# Patient Record
Sex: MALE | Race: WHITE | Employment: FULL TIME | URBAN - METROPOLITAN AREA
[De-identification: names, ages, dates, MRNs, and addresses within clinical notes are randomized per-mention and may not be internally consistent; named-entity substitution may affect disease eponyms.]

---

## 2019-03-30 ENCOUNTER — HOSPITAL ENCOUNTER (EMERGENCY)
Age: 59
Discharge: HOME | End: 2019-03-30
Attending: EMERGENCY MEDICINE
Payer: COMMERCIAL

## 2019-03-30 ENCOUNTER — APPOINTMENT (OUTPATIENT)
Dept: GENERAL RADIOLOGY | Age: 59
End: 2019-03-30
Payer: COMMERCIAL

## 2019-03-30 ENCOUNTER — APPOINTMENT (OUTPATIENT)
Dept: CT IMAGING | Age: 59
End: 2019-03-30
Payer: COMMERCIAL

## 2019-03-30 VITALS
HEART RATE: 69 BPM | OXYGEN SATURATION: 98 % | DIASTOLIC BLOOD PRESSURE: 73 MMHG | SYSTOLIC BLOOD PRESSURE: 121 MMHG | RESPIRATION RATE: 18 BRPM | TEMPERATURE: 97.8 F
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
## 2019-03-30 LAB
ANION GAP SERPL CALCULATED.3IONS-SCNC: 13 MMOL/L (ref 3–16)
BUN BLDV-MCNC: 22 MG/DL (ref 7–20)
CALCIUM SERPL-MCNC: 9.2 MG/DL (ref 8.3–10.6)
CHLORIDE BLD-SCNC: 104 MMOL/L (ref 99–110)
CO2: 24 MMOL/L (ref 21–32)
CREAT SERPL-MCNC: 0.8 MG/DL (ref 0.9–1.3)
GFR AFRICAN AMERICAN: >60
GFR NON-AFRICAN AMERICAN: >60
GLUCOSE BLD-MCNC: 126 MG/DL (ref 70–99)
HCT VFR BLD CALC: 44.6 % (ref 40.5–52.5)
HEMOGLOBIN: 15 G/DL (ref 13.5–17.5)
MCH RBC QN AUTO: 32.2 PG (ref 26–34)
MCHC RBC AUTO-ENTMCNC: 33.7 G/DL (ref 31–36)
MCV RBC AUTO: 95.5 FL (ref 80–100)
PDW BLD-RTO: 12.2 % (ref 12.4–15.4)
PLATELET # BLD: 264 K/UL (ref 135–450)
PMV BLD AUTO: 7.5 FL (ref 5–10.5)
POTASSIUM SERPL-SCNC: 3.6 MMOL/L (ref 3.5–5.1)
RBC # BLD: 4.67 M/UL (ref 4.2–5.9)
SODIUM BLD-SCNC: 141 MMOL/L (ref 136–145)
WBC # BLD: 7.2 K/UL (ref 4–11)

## 2019-03-30 PROCEDURE — 36415 COLL VENOUS BLD VENIPUNCTURE: CPT

## 2019-03-30 PROCEDURE — 73502 X-RAY EXAM HIP UNI 2-3 VIEWS: CPT

## 2019-03-30 PROCEDURE — 73130 X-RAY EXAM OF HAND: CPT

## 2019-03-30 PROCEDURE — 70450 CT HEAD/BRAIN W/O DYE: CPT

## 2019-03-30 PROCEDURE — 99284 EMERGENCY DEPT VISIT MOD MDM: CPT

## 2019-03-30 PROCEDURE — 73090 X-RAY EXAM OF FOREARM: CPT

## 2019-03-30 PROCEDURE — 80048 BASIC METABOLIC PNL TOTAL CA: CPT

## 2019-03-30 PROCEDURE — 85027 COMPLETE CBC AUTOMATED: CPT

## 2019-03-30 PROCEDURE — 73080 X-RAY EXAM OF ELBOW: CPT

## 2019-03-30 PROCEDURE — 6370000000 HC RX 637 (ALT 250 FOR IP): Performed by: EMERGENCY MEDICINE

## 2019-03-30 RX ORDER — MECLIZINE HYDROCHLORIDE 25 MG/1
25 TABLET ORAL 3 TIMES DAILY PRN
Qty: 21 TABLET | Refills: 0 | Status: SHIPPED | OUTPATIENT
Start: 2019-03-30 | End: 2019-04-06

## 2019-03-30 RX ORDER — MECLIZINE HCL 12.5 MG/1
12.5 TABLET ORAL ONCE
Status: COMPLETED | OUTPATIENT
Start: 2019-03-30 | End: 2019-03-30

## 2019-03-30 RX ADMIN — MECLIZINE 12.5 MG: 12.5 TABLET ORAL at 03:34

## 2019-03-30 ASSESSMENT — PAIN SCALES - GENERAL
PAINLEVEL_OUTOF10: 2

## 2019-03-30 ASSESSMENT — PAIN - FUNCTIONAL ASSESSMENT: PAIN_FUNCTIONAL_ASSESSMENT: 0-10

## 2019-03-30 ASSESSMENT — PAIN DESCRIPTION - LOCATION: LOCATION: HIP

## 2019-03-30 ASSESSMENT — PAIN DESCRIPTION - PAIN TYPE: TYPE: ACUTE PAIN

## 2019-03-30 ASSESSMENT — PAIN DESCRIPTION - ORIENTATION: ORIENTATION: RIGHT

## 2019-03-30 NOTE — ED PROVIDER NOTES
Triage Chief Complaint:   Fall (slipped on wet floor at work)    [employer]:  Chelsi Potts is a 62 y.o. male with past medical history of vertigo presents for evaluation of multiple complaints after mechanical trip and fall at work. The patient was at work and fell from standing position when he slipped on wet floor. He states he is complaining of right hip pain, left upper extremity pain from the elbow distal and also states he feels dizzy after the fall. No nausea no vomiting no fever no chills no numbness no tingling    ROS:  At least 9 systems reviewed and otherwise acutely negative except as in the [address] Past Medical History:   Diagnosis Date    Hypertension      No past surgical history on file.   Family History   Problem Relation Age of Onset    Diabetes Father     Heart Disease Father 61        MI    Diabetes Brother     Diabetes Maternal Grandfather     Diabetes Paternal Grandfather      Social History     Socioeconomic History    Marital status:      Spouse name: Not on file    Number of children: Not on file    Years of education: Not on file    Highest education level: Not on file   Occupational History    Not on file   Social Needs    Financial resource strain: Not on file    Food insecurity:     Worry: Not on file     Inability: Not on file    Transportation needs:     Medical: Not on file     Non-medical: Not on file   Tobacco Use    Smoking status: Never Smoker    Smokeless tobacco: Never Used   Substance and Sexual Activity    Alcohol use: No    Drug use: No    Sexual activity: Not on file   Lifestyle    Physical activity:     Days per week: Not on file     Minutes per session: Not on file    Stress: Not on file   Relationships    Social connections:     Talks on phone: Not on file     Gets together: Not on file     Attends Scientologist service: Not on file     Active member of club or organization: Not on file     Attends meetings of clubs or organizations: Not on file Relationship status: Not on file    Intimate partner violence:     Fear of current or ex partner: Not on file     Emotionally abused: Not on file     Physically abused: Not on file     Forced sexual activity: Not on file   Other Topics Concern    Not on file   Social History Narrative    Not on file     No current facility-administered medications for this encounter. Current Outpatient Medications   Medication Sig Dispense Refill    lisinopril-hydrochlorothiazide (PRINZIDE;ZESTORETIC) 20-12.5 MG per tablet TAKE ONE TABLET BY MOUTH EVERY DAY 90 tablet 3    aspirin 81 MG tablet Take 81 mg by mouth daily. No Known Allergies    [unfilled]    Nursing Notes Reviewed    Physical Exam:  Vitals:    03/30/19 0416   BP: (!) 112/8   Pulse: 68   Resp:    Temp:    SpO2: 98%       Physical Exam   Constitutional: Patient is oriented to person, place, and time; appears well-developed and well-nourished. Non-toxic appearance. No distress. HENT:   Head: Normocephalic and atraumatic. Eyes: Conjunctivae and EOM are normal. Pupils are equal, round, and reactive to light. No scleral icterus. Neck: Full passive range of motion without pain. Neck supple. No spinous process tenderness and no muscular tenderness present. Cardiovascular: Normal rate and regular rhythm. Exam reveals no gallop and no friction rub. All extremities NVI  No murmur heard. Pulmonary/Chest: Effort normal and breath sounds normal. No respiratory distress. No crepitus to chest   Abdominal: Soft. Normal appearance. No distension and no mass. There is no tenderness. There is no rigidity. Musculoskeletal:        Right/Left shoulder: Normal.        Right/Left elbow: Normal.       Right/Left wrist: Normal.        Right/Left hip:Normal       Right/Left knee: Normal.        Right/Left ankle: Normal.        Right/Left foot: Normal.   Neurological: Alert and oriented to person, place, and time. Skin: Skin is warm and dry. No rash noted. No traumatic injuries noted  Psychiatric: Normal mood and affect. I have reviewed and interpreted all of the currently available lab results from this visit (if applicable):  Results for orders placed or performed during the hospital encounter of 03/30/19   CBC   Result Value Ref Range    WBC 7.2 4.0 - 11.0 K/uL    RBC 4.67 4.20 - 5.90 M/uL    Hemoglobin 15.0 13.5 - 17.5 g/dL    Hematocrit 44.6 40.5 - 52.5 %    MCV 95.5 80.0 - 100.0 fL    MCH 32.2 26.0 - 34.0 pg    MCHC 33.7 31.0 - 36.0 g/dL    RDW 12.2 (L) 12.4 - 15.4 %    Platelets 611 688 - 765 K/uL    MPV 7.5 5.0 - 10.5 fL   Basic Metabolic Panel   Result Value Ref Range    Sodium 141 136 - 145 mmol/L    Potassium 3.6 3.5 - 5.1 mmol/L    Chloride 104 99 - 110 mmol/L    CO2 24 21 - 32 mmol/L    Anion Gap 13 3 - 16    Glucose 126 (H) 70 - 99 mg/dL    BUN 22 (H) 7 - 20 mg/dL    CREATININE 0.8 (L) 0.9 - 1.3 mg/dL    GFR Non-African American >60 >60    GFR African American >60 >60    Calcium 9.2 8.3 - 10.6 mg/dL        Radiographs (if obtained):  [] The following radiograph was interpreted by myself in the absence of a radiologist:  [x] Radiologist's Report Reviewed:     XR ELBOW LEFT (MIN 3 VIEWS) (Final result)   Result time 03/30/19 04:37:21   Final result by David Sesay MD (03/30/19 04:37:21)                Impression:    No acute osseous abnormality. Narrative:    EXAMINATION:  3 XRAY VIEWS OF THE LEFT ELBOW    3/30/2019 4:08 am    COMPARISON:  None. HISTORY:  ORDERING SYSTEM PROVIDED HISTORY: fall  TECHNOLOGIST PROVIDED HISTORY:  Reason for exam:->fall  Ordering Physician Provided Reason for Exam: injury  Acuity: Acute  Type of Exam: Initial  Mechanism of Injury: pt fell this evening landing on oustretched left arm    FINDINGS:  There is no evidence of acute fracture.  There is normal alignment.  No acute  joint abnormality.  No focal osseous lesion.  No focal soft tissue abnormality.                    XR RADIUS ULNA LEFT (2 VIEWS) (Final result)   Result time 03/30/19 04:38:32   Final result by Rosalba Dillard MD (03/30/19 04:38:32)                Impression:    No acute osseous abnormality. Narrative:    EXAMINATION:  AP AND LATERAL XRAY VIEWS OF THE LEFT FOREARM; 3 XRAY VIEWS OF THE LEFT HAND    3/30/2019 4:08 am    COMPARISON:  None. HISTORY:  ORDERING SYSTEM PROVIDED HISTORY: fall  TECHNOLOGIST PROVIDED HISTORY:  Reason for exam:->fall  Ordering Physician Provided Reason for Exam: injury  Acuity: Acute  Type of Exam: Initial  Mechanism of Injury: pt fell landing on outstretched left arm    FINDINGS:  There is no evidence of acute fracture.  There is normal alignment.  No acute  joint abnormality.  No focal osseous lesion. No focal soft tissue abnormality.                    XR HAND LEFT (MIN 3 VIEWS) (Final result)   Result time 03/30/19 04:38:32   Final result by Rosalba Dillard MD (03/30/19 04:38:32)                Impression:    No acute osseous abnormality. Narrative:    EXAMINATION:  AP AND LATERAL XRAY VIEWS OF THE LEFT FOREARM; 3 XRAY VIEWS OF THE LEFT HAND    3/30/2019 4:08 am    COMPARISON:  None. HISTORY:  ORDERING SYSTEM PROVIDED HISTORY: fall  TECHNOLOGIST PROVIDED HISTORY:  Reason for exam:->fall  Ordering Physician Provided Reason for Exam: injury  Acuity: Acute  Type of Exam: Initial  Mechanism of Injury: pt fell landing on outstretched left arm    FINDINGS:  There is no evidence of acute fracture.  There is normal alignment.  No acute  joint abnormality.  No focal osseous lesion. No focal soft tissue abnormality.                    XR HIP RIGHT (2-3 VIEWS) (Final result)   Result time 03/30/19 04:39:30   Final result by Rosalba Dillard MD (03/30/19 04:39:30)                Impression:    No acute osseous abnormality. Narrative:    EXAMINATION:  2 XRAY VIEWS OF THE RIGHT HIP    3/30/2019 4:08 am    COMPARISON:  None.     HISTORY:  ORDERING SYSTEM PROVIDED HISTORY: fall  TECHNOLOGIST PROVIDED HISTORY:  Reason for exam:->fall  Ordering Physician Provided Reason for Exam: fell  Acuity: Acute  Type of Exam: Initial  Mechanism of Injury: pt fell hitting right hip, now sore, pt walking without  trouble    FINDINGS:  There is no evidence of acute fracture.  There is normal alignment.  No acute  joint abnormality.  No focal osseous lesion. No focal soft tissue abnormality.                    CT HEAD WO CONTRAST (Final result)   Result time 03/30/19 04:38:10   Final result by Aruna Amin MD (03/30/19 04:38:10)                Impression:    1. No intracranial hemorrhage or acute transcortical infarct. 2. Mild white matter hypoattenuation likely the sequela of chronic small  vessel ischemia.  If there is persistent clinical concern for acute ischemia,  MRI would be the more sensitive modality. Narrative:    EXAMINATION:  CT OF THE HEAD WITHOUT CONTRAST  3/30/2019 4:06 am    TECHNIQUE:  CT of the head was performed without the administration of intravenous  contrast. Dose modulation, iterative reconstruction, and/or weight based  adjustment of the mA/kV was utilized to reduce the radiation dose to as low  as reasonably achievable. COMPARISON:  None. HISTORY:  ORDERING SYSTEM PROVIDED HISTORY: fell, now dizzy  TECHNOLOGIST PROVIDED HISTORY:  Has a \"code stroke\" or \"stroke alert\" been called? ->No  Ordering Physician Provided Reason for Exam: vertigo  Acuity: Acute  Type of Exam: Initial  Additional signs and symptoms: pt states felt dizzy from vertigo, fell but  did not have LOC or hit head    FINDINGS:  BRAIN/VENTRICLES:    There is no acute hemorrhage.  The ventricles and basal cisterns are patent. There is no midline shift or extra-axial collection. Devonte Arrington is a mild degree  of periventricular, subcortical and deep white matter hypoattenuation likely  the sequela of chronic small vessel ischemia.  No acute transcortical infarct  is identified.     ORBITS: The visualized portion of the orbits demonstrate no acute abnormality. SINUSES: The visualized paranasal sinuses and mastoid air cells demonstrate  no acute abnormality. SOFT TISSUES/SKULL:  No depressed calvarial fracture. EKG (if obtained): (All EKG's are interpreted by myself in the absence of a cardiologist)  Initial EKG on my interpretation shows n/a    MDM:  Differential diagnosis: Intracranial hemorrhage, fracture, dislocation, concussion    CBC and BMP unremarkable. Patient feels better after Antivert. .  I clearly have explained today's imaging does not rule out concussion, missed/occult fractures, nor ligamentous and/or tendonous injury and therefore patient must follow-up with PCP/orthopedics as referred for re-evaluation and possible advanced and/or repeat imaging. Discussed results, diagnosis and plan with patient and/or family. Questions addressed. Disposition and follow-up agreed upon. Specific discharge instructions explained. The patient and/or family and I have discussed the diagnosis and risks, and we agree with discharging home to follow-up with their primary care, specialist or referral doctor. In the event that medications were prescribed the risk profile of these medications were detailed expressly. We also discussed returning to the Emergency Department immediately if new or worsening symptoms occur. We have discussed the symptoms which are most concerning that necessitate immediate return. Old records reviewed. Labs and imaging reviewed and results discussed with patient. .        Patient was given scripts for the following medications. I counseled patient how to take these medications. New Prescriptions    No medications on file         CRITICAL CARE TIME   Total Critical Care time was 0 minutes, excluding separately reportable procedures. There was a high probability of clinically significant/life threatening deterioration in the patient's condition which required my urgent intervention. Clinical Impression:  Fall, Dizziness s/p fall  (Please note that portions of this note may have been completed with a voice recognition program. Efforts were made to edit the dictations but occasionally words are mis-transcribed.)    MD Cary Houston MD  03/30/19 [id number]

## 2019-11-26 ENCOUNTER — OFFICE VISIT (OUTPATIENT)
Dept: FAMILY MEDICINE CLINIC | Age: 59
End: 2019-11-26
Payer: COMMERCIAL

## 2019-11-26 VITALS
SYSTOLIC BLOOD PRESSURE: 130 MMHG | DIASTOLIC BLOOD PRESSURE: 74 MMHG | RESPIRATION RATE: 17 BRPM | WEIGHT: 226.8 LBS | HEIGHT: 68 IN | BODY MASS INDEX: 34.37 KG/M2 | HEART RATE: 78 BPM | OXYGEN SATURATION: 98 %

## 2019-11-26 DIAGNOSIS — Z13.220 SCREENING, LIPID: ICD-10-CM

## 2019-11-26 DIAGNOSIS — Z12.5 SCREENING FOR MALIGNANT NEOPLASM OF PROSTATE: ICD-10-CM

## 2019-11-26 DIAGNOSIS — I10 ESSENTIAL HYPERTENSION: ICD-10-CM

## 2019-11-26 DIAGNOSIS — Z00.00 ROUTINE GENERAL MEDICAL EXAMINATION AT A HEALTH CARE FACILITY: Primary | ICD-10-CM

## 2019-11-26 DIAGNOSIS — G57.12 NEUROPATHIC PAIN INVOLVING LEFT LATERAL FEMORAL CUTANEOUS NERVE: ICD-10-CM

## 2019-11-26 DIAGNOSIS — Z12.11 SCREEN FOR COLON CANCER: ICD-10-CM

## 2019-11-26 LAB
ALBUMIN SERPL-MCNC: 4.5 G/DL (ref 3.4–5)
ANION GAP SERPL CALCULATED.3IONS-SCNC: 11 MMOL/L (ref 3–16)
BUN BLDV-MCNC: 21 MG/DL (ref 7–20)
CALCIUM SERPL-MCNC: 9.8 MG/DL (ref 8.3–10.6)
CHLORIDE BLD-SCNC: 99 MMOL/L (ref 99–110)
CHOLESTEROL, TOTAL: 154 MG/DL (ref 0–199)
CO2: 26 MMOL/L (ref 21–32)
CREAT SERPL-MCNC: 0.8 MG/DL (ref 0.9–1.3)
GFR AFRICAN AMERICAN: >60
GFR NON-AFRICAN AMERICAN: >60
GLUCOSE BLD-MCNC: 98 MG/DL (ref 70–99)
HDLC SERPL-MCNC: 40 MG/DL (ref 40–60)
LDL CHOLESTEROL CALCULATED: 94 MG/DL
PHOSPHORUS: 3 MG/DL (ref 2.5–4.9)
POTASSIUM SERPL-SCNC: 4.6 MMOL/L (ref 3.5–5.1)
PROSTATE SPECIFIC ANTIGEN: 0.58 NG/ML (ref 0–4)
SODIUM BLD-SCNC: 136 MMOL/L (ref 136–145)
TRIGL SERPL-MCNC: 101 MG/DL (ref 0–150)
VLDLC SERPL CALC-MCNC: 20 MG/DL

## 2019-11-26 PROCEDURE — 36415 COLL VENOUS BLD VENIPUNCTURE: CPT | Performed by: FAMILY MEDICINE

## 2019-11-26 PROCEDURE — 99386 PREV VISIT NEW AGE 40-64: CPT | Performed by: FAMILY MEDICINE

## 2019-11-26 ASSESSMENT — PATIENT HEALTH QUESTIONNAIRE - PHQ9: DEPRESSION UNABLE TO ASSESS: PT REFUSES

## 2020-01-20 ENCOUNTER — TELEPHONE (OUTPATIENT)
Dept: FAMILY MEDICINE CLINIC | Age: 60
End: 2020-01-20

## 2020-01-20 RX ORDER — LISINOPRIL AND HYDROCHLOROTHIAZIDE 20; 12.5 MG/1; MG/1
TABLET ORAL
Qty: 90 TABLET | Refills: 3 | Status: SHIPPED | OUTPATIENT
Start: 2020-01-20 | End: 2021-08-23 | Stop reason: SDUPTHER

## 2020-02-26 ENCOUNTER — TELEPHONE (OUTPATIENT)
Dept: FAMILY MEDICINE CLINIC | Age: 60
End: 2020-02-26

## 2020-04-07 ENCOUNTER — NURSE ONLY (OUTPATIENT)
Dept: FAMILY MEDICINE CLINIC | Age: 60
End: 2020-04-07
Payer: COMMERCIAL

## 2020-04-07 LAB
CONTROL: NORMAL
HEMOCCULT STL QL: NEGATIVE

## 2020-04-07 PROCEDURE — 82274 ASSAY TEST FOR BLOOD FECAL: CPT | Performed by: FAMILY MEDICINE

## 2020-09-28 ENCOUNTER — TELEPHONE (OUTPATIENT)
Dept: FAMILY MEDICINE CLINIC | Age: 60
End: 2020-09-28

## 2020-09-28 ENCOUNTER — VIRTUAL VISIT (OUTPATIENT)
Dept: FAMILY MEDICINE CLINIC | Age: 60
End: 2020-09-28
Payer: COMMERCIAL

## 2020-09-28 ENCOUNTER — NURSE TRIAGE (OUTPATIENT)
Dept: OTHER | Facility: CLINIC | Age: 60
End: 2020-09-28

## 2020-09-28 PROCEDURE — 99213 OFFICE O/P EST LOW 20 MIN: CPT | Performed by: FAMILY MEDICINE

## 2020-09-28 ASSESSMENT — PATIENT HEALTH QUESTIONNAIRE - PHQ9: DEPRESSION UNABLE TO ASSESS: URGENT/EMERGENT SITUATION

## 2020-09-28 NOTE — TELEPHONE ENCOUNTER
Pt will follow up with [facility] or ED if unable to get appointment. Warm transfer to scheduling  Please do not respond to the triage nurse through this encounter. Any subsequent communication should be directly with the patient.

## 2020-09-28 NOTE — PROGRESS NOTES
moist.     External Ears [x] Normal  [] Abnormal-     Neck: [x] No visualized mass     Pulmonary/Chest: [x] Respiratory effort normal.  [x] No visualized signs of difficulty breathing or respiratory distress        [] Abnormal-      Musculoskeletal:   [] Normal gait with no signs of ataxia         [x] Normal range of motion of neck        [] Abnormal-       Neurological:        [x] No Facial Asymmetry (Cranial nerve 7 motor function) (limited exam to video visit)          [] No gaze palsy        [] Abnormal-         Skin:        [x] No significant exanthematous lesions or discoloration noted on facial skin         [] Abnormal-            Psychiatric:       [x] Normal Affect [] No Hallucinations        [] Abnormal-     Other pertinent observable physical exam findings-     ASSESSMENT/PLAN:  1. Viral illness  Discussed that symptoms are likely viral.  COVID-19 is in differential.  Discussed CDC isolation criteria for him, along with quarantine guidelines for asymptomatic family members. He expressed understanding. Gave patient number to schedule for [address] [place] [address] testing site. Discussed high rate of false negatives, and recommendation for him to follow the 10-day isolation even if this test is negative, since his symptoms are so concerning for COVID. Patient told to go to the emergency room if develops significant shortness of breath or chest pain. Otherwise he should let us know if he has any mild worsenings that are concerning      Alexei Santos is a 61 y.o. male being evaluated by a Virtual Visit (video visit) encounter to address concerns as mentioned above. A caregiver was present when appropriate. Due to this being a TeleHealth encounter (During SOUJS-22 public health emergency), evaluation of the following organ systems was limited: Vitals/Constitutional/EENT/Resp/CV/GI//MS/Neuro/Skin/Heme-Lymph-Imm.   Pursuant to the emergency declaration under the [facility] Act, 1135 waiver authority and the Coronavirus Preparedness and Response Supplemental Appropriations Act, this Virtual Visit was conducted with patient's (and/or legal guardian's) consent, to reduce the patient's risk of exposure to COVID-19 and provide necessary medical care. The patient (and/or legal guardian) has also been advised to contact this office for worsening conditions or problems, and seek emergency medical treatment and/or call 911 if deemed necessary. Patient identification was verified at the start of the visit: Yes    Total time spent on this encounter: Not billed by time    Services were provided through a video synchronous discussion virtually to substitute for in-person clinic visit. Patient and provider were located at their individual homes. --Trisha Brown MD on 9/28/2020 at 1:24 PM    An electronic signature was used to authenticate this note.

## 2020-09-28 NOTE — TELEPHONE ENCOUNTER
Sinus drainage, feels like he has fever, fatigue no energy     Reason for Disposition   Patient wants to be seen    Answer Assessment - Initial Assessment Questions  1. DESCRIPTION: \"Describe how you are feeling. \"      Pt has been coming fatigued all the time even walking up steps   2. SEVERITY: \"How bad is it? \"  \"Can you stand and walk? \"    - MILD - Feels weak or tired, but does not interfere with work, school or normal activities    - [facility] to stand and walk; weakness interferes with work, school, or normal activities    - SEVERE - Unable to stand or walk      Mild  3. ONSET:  \"When did the weakness begin? \"      Friday  4. CAUSE: \"What do you think is causing the weakness? \"      unknown  5. MEDICINES: Vonita Kick you recently started a new medicine or had a change in the amount of a medicine? \"      Pt denies   6. OTHER SYMPTOMS: \"Do you have any other symptoms? \" (e.g., chest pain, fever, cough, SOB, vomiting, diarrhea, bleeding, other areas of pain)      Cough, drainage  7. PREGNANCY: \"Is there any chance you are pregnant? \" \"When was your last menstrual period? \"      NA    Protocols used: WEAKNESS (GENERALIZED) AND FATIGUE-ADULT-[place]

## 2020-09-28 NOTE — TELEPHONE ENCOUNTER
Reason for Disposition   Caller has already spoken with another triager and has no further questions    Protocols used: NO CONTACT OR DUPLICATE CONTACT CALL-ADULT-[place]

## 2020-09-29 ENCOUNTER — TELEPHONE (OUTPATIENT)
Dept: FAMILY MEDICINE CLINIC | Age: 60
End: 2020-09-29

## 2020-09-30 NOTE — TELEPHONE ENCOUNTER
PT informed per Dr Richa Sarah     Developed nausea and dizziness over the last few hours, no vomiting

## 2020-10-02 ENCOUNTER — TELEPHONE (OUTPATIENT)
Dept: FAMILY MEDICINE CLINIC | Age: 60
End: 2020-10-02

## 2020-10-02 NOTE — TELEPHONE ENCOUNTER
----- Message from [facility] sent at 10/2/2020 10:45 AM EDT -----  Subject: Message to Provider    QUESTIONS  Information for Provider? Patient tested positive for Covid and he wants   to know what the next steps would be?   ---------------------------------------------------------------------------  --------------  CALL BACK INFO  What is the best way for the office to contact you? OK to leave message on   voicemail  Preferred Call Back Phone Number? [phone]  ---------------------------------------------------------------------------  --------------  SCRIPT ANSWERS  Relationship to Patient?  Self

## 2020-10-05 ENCOUNTER — TELEPHONE (OUTPATIENT)
Dept: FAMILY MEDICINE CLINIC | Age: 60
End: 2020-10-05

## 2020-10-05 RX ORDER — DEXAMETHASONE 6 MG/1
6 TABLET ORAL
Qty: 10 TABLET | Refills: 0 | Status: SHIPPED | OUTPATIENT
Start: 2020-10-05 | End: 2020-10-15

## 2020-10-05 NOTE — TELEPHONE ENCOUNTER
Tested positive for covid. Been off work x 1 week. Fever has been up and down. Has taken dayquil past 2 days. It has helped a little. Wanting to know if there is something else he can take or use for the fever and cough he still has. Please advise.  Pt is reachable at [phone]

## 2020-10-09 ENCOUNTER — TELEPHONE (OUTPATIENT)
Dept: FAMILY MEDICINE CLINIC | Age: 60
End: 2020-10-09

## 2020-10-09 RX ORDER — BENZONATATE 200 MG/1
200 CAPSULE ORAL 3 TIMES DAILY PRN
Qty: 21 CAPSULE | Refills: 0 | Status: SHIPPED | OUTPATIENT
Start: 2020-10-09 | End: 2020-10-16

## 2020-10-09 NOTE — TELEPHONE ENCOUNTER
Tessalon rx'd - use [facility] price ~ $6 with coupon - ask for [facility]

## 2020-10-09 NOTE — TELEPHONE ENCOUNTER
----- Message from Mathew Andres sent at 10/9/2020  9:22 AM EDT -----  Subject: Message to Provider    QUESTIONS  Information for Provider? Patient has Covid and was prescribed something   for his cough five days ago. It's helping   but he's still having a hard cough. He is having a hard time sleeping and   has been up most nights. He wants something to relax him. He has night   sweats and chills. ---------------------------------------------------------------------------  --------------  Kirk GARCIA  What is the best way for the office to contact you? OK to leave message on   voicemail  Preferred Call Back Phone Number? [phone]  ---------------------------------------------------------------------------  --------------  SCRIPT ANSWERS  Relationship to Patient?  Self

## 2020-10-14 ENCOUNTER — TELEPHONE (OUTPATIENT)
Dept: FAMILY MEDICINE CLINIC | Age: 60
End: 2020-10-14

## 2020-10-14 NOTE — TELEPHONE ENCOUNTER
PT called in, tested positive for COVID last week, PT feels like when he is laying down to go to sleep \"does not get enough oxygen. \"  PT is prescribed Tessalon for cough  PT c/o \"trouble breathing\" but not SOB. Denies sob when active. \"when walking around I feel okay, as far as I am concerned. \"  PT slept a total of \"1 hour\" last night due to symptoms.   PT does not want to have another covid test or seen in ER if he does not needed to, would like to know if he can be seen to have lungs examined

## 2020-10-22 ENCOUNTER — VIRTUAL VISIT (OUTPATIENT)
Dept: FAMILY MEDICINE CLINIC | Age: 60
End: 2020-10-22
Payer: COMMERCIAL

## 2020-10-22 PROCEDURE — 99213 OFFICE O/P EST LOW 20 MIN: CPT | Performed by: FAMILY MEDICINE

## 2020-10-22 NOTE — PROGRESS NOTES
TELEHEALTH EVALUATION -- Audio/Visual (During WDAUO-63 public health emergency)    Edd Johnson is a 61 y.o. male being evaluated by a Virtual Visit (video visit) encounter to address concerns as mentioned above. A caregiver was present when appropriate. Due to this being a TeleHealth encounter (During ECGKV-28 public health emergency), evaluation of the following organ systems was limited: Vitals/Constitutional/EENT/Resp/CV/GI//MS/Neuro/Skin/Heme-Lymph-Imm. Pursuant to the emergency declaration under the [address] [place] [address], [address] [place] [address] authority and the [employer] Act, this Virtual Visit was conducted with patient's (and/or legal guardian's) consent, to reduce the patient's risk of exposure to COVID-19 and provide necessary medical care. The patient (and/or legal guardian) has also been advised to contact this office for worsening conditions or problems, and seek emergency medical treatment and/or call 911 if deemed necessary. Services were provided through a video synchronous discussion virtually to substitute for in-person clinic visit. Patient and provider were located at their individual homes. --Margaret Altman MD on 10/22/2020 at 10:44 AM    An electronic signature was used to authenticate this note. No chief complaint on file.     Family History   Problem Relation Age of Onset    Diabetes Father     Heart Disease Father 61        MI    Diabetes Brother     Diabetes Maternal Grandfather     Diabetes Paternal Grandfather      Social History     Socioeconomic History    Marital status:      Spouse name: Not on file    Number of children: Not on file    Years of education: Not on file    Highest education level: Not on file   Occupational History    Not on file   Social Needs    Financial resource strain: Not on file    Food insecurity     Worry: Not on file     Inability: Not on file   [facility] Transportation needs     Medical: Not on file     Non-medical: Not on file   Tobacco Use    Smoking status: Never Smoker    Smokeless tobacco: Never Used   Substance and Sexual Activity    Alcohol use: No    Drug use: No    Sexual activity: Not on file   Lifestyle    Physical activity     Days per week: Not on file     Minutes per session: Not on file    Stress: Not on file   Relationships    Social connections     Talks on phone: Not on file     Gets together: Not on file     Attends Pentecostal service: Not on file     Active member of club or organization: Not on file     Attends meetings of clubs or organizations: Not on file     Relationship status: Not on file    Intimate partner violence     Fear of current or ex partner: Not on file     Emotionally abused: Not on file     Physically abused: Not on file     Forced sexual activity: Not on file   Other Topics Concern    Not on file   Social History Narrative    Not on file       Current Outpatient Medications:     lisinopril-hydrochlorothiazide (PRINZIDE;ZESTORETIC) 20-12.5 MG per tablet, TAKE ONE TABLET BY MOUTH EVERY DAY, Disp: 90 tablet, Rfl: 3    aspirin 81 MG tablet, Take 81 mg by mouth daily. , Disp: , Rfl:   No Known Allergies    Patient Active Problem List   Diagnosis    Hypertension    Obesity (BMI 30-39. 9)    Dyslipidemia    FH: CAD (coronary artery disease)    Low HDL (under 40)    Prediabetes       HPI / ROS: Berto Wray presents for evaluation and management of :    # ER FU SOB recent COVID infection    Wt Readings from Last 3 Encounters:   11/26/19 226 lb 12.8 oz (102.9 kg)   01/12/16 229 lb (103.9 kg)   12/28/15 230 lb (104.3 kg)       PE : virtual visit  - Patient was diagnosed with COVID via positive test on 10/1/2020.  - Patient was prescribed dexamethasone for supportive care on 10/5/2020 and Tessalon on 10/09/2020.  - Patient was recommended to present to ED on 10/14/2020 d/t progressive orthopnea and SOB.     Presented to [facility]. He informed them that he had tested positive for COVID on the 10/1. Per patient, CXR demonstrated R lower lobe opacities. ED physicians diagnosed CAP, prescribed Doxycycline Monohydrate (100 mg BID for 10 days, to complete on 10/23), and discharged patient. Patient returned to work on Thursday 10/15, and occasional coughing concerned the , who requested that he get evaluated again. Since presenting to the hospital, patient has had minimal coughing and sneezing. Patient reports \"tickling of the throat\" causing occasional coughing and rhinorrhea/congestion. # Disorientation  Patient describes some \"loopiness\" (light-headedness, disorientation) during sequences of rapid activity throughout the day (most notable during his morning routine). Exacerbated when taking both Tessalon and Doxycycline monohydrate (stopped taking Tessalon on Saturday 10/17). Has been getting better, but still present at work and in the morning. Formerly had difficulties with driving but no longer does. # Sleep Difficulties  Patient reports difficulty falling asleeping since COVID. Night-time awakenings are not occurring, but may take patient 3 hours to fall asleep initially. Patient describes anxiety over difficulty falling asleep which exacerbate the issue. Patient did not sleep well during COVID illness d/t coughing. Patient denies: chest pain, headaches, changes to vision, abdominal pain, constipation, diarrhea, dysuria, melena, hematuria. Diagnosis Orders   1. COVID-19 virus infection      improving across board we agree rest and continue tessalon     No orders of the defined types were placed in this encounter.

## 2020-10-29 ENCOUNTER — TELEPHONE (OUTPATIENT)
Dept: FAMILY MEDICINE CLINIC | Age: 60
End: 2020-10-29

## 2020-10-29 RX ORDER — AZITHROMYCIN 250 MG/1
TABLET, FILM COATED ORAL
Qty: 1 PACKET | Refills: 0 | Status: SHIPPED | OUTPATIENT
Start: 2020-10-29 | End: 2020-11-08

## 2020-10-29 NOTE — TELEPHONE ENCOUNTER
PT called in stating that he has a VV last week with Dr. Yue Patterson and back on 10/1 he had tested positive for COVID. Pt says that the issue he had previously now feels like it's in the other lung, the exact issue from the VV. PT would like to know if something can be called in for him.      Please call PT back: [phone]

## 2020-11-28 ENCOUNTER — TELEPHONE (OUTPATIENT)
Dept: FAMILY MEDICINE CLINIC | Age: 60
End: 2020-11-28

## 2020-11-28 RX ORDER — PREDNISONE 10 MG/1
10 TABLET ORAL 2 TIMES DAILY
Qty: 10 TABLET | Refills: 0 | Status: SHIPPED | OUTPATIENT
Start: 2020-11-28 | End: 2020-12-03

## 2021-08-22 NOTE — PROGRESS NOTES
2021    Sriram Roper (:  1960) is a 64 y.o. male, here for evaluation of the following chief complaint(s):  Hypertension, Toe Pain (After clipping toenails ), and Hand Pain (R hand \"tingling\")      ASSESSMENT/PLAN:     Diagnosis Orders   1. Routine general medical examination at a health care facility     2. Screening PSA (prostate specific antigen)  PSA screening   3. Screen for colon cancer  POCT Fecal Immunochemical Test (FIT)   4. Screening, lipid      reviewed lipids HDL 35  this eyar   5. Essential hypertension  Renal Function Panel    at goal cont meds check renal   6. Metabolic syndrome      fasting sugar was 83 outside lab   7. Class 1 obesity due to excess calories without serious comorbidity with body mass index (BMI) of 34.0 to 34.9 in adult      LCIFa dvised       Return in about 6 months (around 2022) for HTN, Metabolic syndrome, Obesity. An electronic signature was used to authenticate this note.     @SIG@    SUBJECTIVE/OBJECTIVE:  (NOTE : prior results listed below reviewed at this visit to assist in medical decision making.)    HPI / ROS    Belated f/u mult issues    # Preventive and other issues  # Lipids - recent screening history:  Lab Results   Component Value Date    LDLCALC 94 2019     Lab Results   Component Value Date    TRIG 101 2019     Lab Results   Component Value Date    HDL 40 2019     Lab Results   Component Value Date    CHOL 154 2019     # PSA screening history:  Lab Results   Component Value Date    PSA 0.58 2019    PSA 0.53 2015    PSA 0.48 2013     # screen colon cancer - screening status discussed with patient; reviewed today prior testing dated 2020 FIT cards; new provided    # Metabolic Syndrome - check A1C today and reviewed need for lower carb dieting  Lab Results   Component Value Date    LABA1C 5.7 2016     Lab Results   Component Value Date    .9 2016     # HTN - ysabel meds

## 2021-08-22 NOTE — PATIENT INSTRUCTIONS
Low Carb Eating with Intermittent Fasting    target < 100 grams of carb daily; ZERO grained based carbs  Get only carbs from whole fruits - strawberries, raspberries, blackberries, apples, oranges, pineapples, bananas etc.    Intermittent Fasting (\"I. F. \") / Eating Window   Only eat between noon and 8 PM  Water any time  Coffee with cream and no more than 1 teaspoon sugar OK in AM    Google/ YouTube AUTOPHAGY - a primary benefit of IF    Other helpful books and websites  1) Wheat Belly by Sirena Doherty MD (www.ROXIMITY. [employer])  2) The Primal Blueprint by Jacque Lewis (www.Veenome - review success stories)  2) Living Paleo For Dummies

## 2021-08-23 ENCOUNTER — OFFICE VISIT (OUTPATIENT)
Dept: FAMILY MEDICINE CLINIC | Age: 61
End: 2021-08-23
Payer: COMMERCIAL

## 2021-08-23 ENCOUNTER — TELEPHONE (OUTPATIENT)
Dept: FAMILY MEDICINE CLINIC | Age: 61
End: 2021-08-23

## 2021-08-23 VITALS
HEART RATE: 74 BPM | WEIGHT: 233.2 LBS | DIASTOLIC BLOOD PRESSURE: 80 MMHG | HEIGHT: 68 IN | SYSTOLIC BLOOD PRESSURE: 128 MMHG | BODY MASS INDEX: 35.34 KG/M2 | TEMPERATURE: 98.4 F | OXYGEN SATURATION: 98 % | RESPIRATION RATE: 15 BRPM

## 2021-08-23 DIAGNOSIS — Z12.5 SCREENING PSA (PROSTATE SPECIFIC ANTIGEN): ICD-10-CM

## 2021-08-23 DIAGNOSIS — I10 ESSENTIAL HYPERTENSION: ICD-10-CM

## 2021-08-23 DIAGNOSIS — E88.81 METABOLIC SYNDROME: ICD-10-CM

## 2021-08-23 DIAGNOSIS — E66.09 CLASS 1 OBESITY DUE TO EXCESS CALORIES WITHOUT SERIOUS COMORBIDITY WITH BODY MASS INDEX (BMI) OF 34.0 TO 34.9 IN ADULT: ICD-10-CM

## 2021-08-23 DIAGNOSIS — Z12.11 SCREEN FOR COLON CANCER: ICD-10-CM

## 2021-08-23 DIAGNOSIS — Z13.220 SCREENING, LIPID: ICD-10-CM

## 2021-08-23 DIAGNOSIS — Z00.00 ROUTINE GENERAL MEDICAL EXAMINATION AT A HEALTH CARE FACILITY: Primary | ICD-10-CM

## 2021-08-23 LAB
ALBUMIN SERPL-MCNC: 4.3 G/DL (ref 3.4–5)
ANION GAP SERPL CALCULATED.3IONS-SCNC: 14 MMOL/L (ref 3–16)
BUN BLDV-MCNC: 18 MG/DL (ref 7–20)
CALCIUM SERPL-MCNC: 9.8 MG/DL (ref 8.3–10.6)
CHLORIDE BLD-SCNC: 102 MMOL/L (ref 99–110)
CHOLESTEROL, TOTAL: 162 MG/DL (ref 0–199)
CO2: 26 MMOL/L (ref 21–32)
CREAT SERPL-MCNC: 0.9 MG/DL (ref 0.8–1.3)
GFR AFRICAN AMERICAN: >60
GFR NON-AFRICAN AMERICAN: >60
GLUCOSE BLD-MCNC: 114 MG/DL (ref 70–99)
HDLC SERPL-MCNC: 37 MG/DL (ref 40–60)
LDL CHOLESTEROL CALCULATED: 104 MG/DL
PHOSPHORUS: 2.6 MG/DL (ref 2.5–4.9)
POTASSIUM SERPL-SCNC: 4.5 MMOL/L (ref 3.5–5.1)
PROSTATE SPECIFIC ANTIGEN: 0.56 NG/ML (ref 0–4)
SODIUM BLD-SCNC: 142 MMOL/L (ref 136–145)
TRIGL SERPL-MCNC: 106 MG/DL (ref 0–150)
VLDLC SERPL CALC-MCNC: 21 MG/DL

## 2021-08-23 PROCEDURE — 99396 PREV VISIT EST AGE 40-64: CPT | Performed by: FAMILY MEDICINE

## 2021-08-23 PROCEDURE — 36415 COLL VENOUS BLD VENIPUNCTURE: CPT | Performed by: FAMILY MEDICINE

## 2021-08-23 RX ORDER — LISINOPRIL AND HYDROCHLOROTHIAZIDE 20; 12.5 MG/1; MG/1
TABLET ORAL
Qty: 90 TABLET | Refills: 3 | Status: SHIPPED | OUTPATIENT
Start: 2021-08-23

## 2021-08-23 NOTE — TELEPHONE ENCOUNTER
PT came back into the office stating that he forgot to mention that he needed a refill for his Lisinopril. Please send to the [facility] in [facility].

## 2021-08-24 LAB
ESTIMATED AVERAGE GLUCOSE: 116.9 MG/DL
HBA1C MFR BLD: 5.7 %

## 2021-12-24 ENCOUNTER — HOSPITAL ENCOUNTER (EMERGENCY)
Age: 61
Discharge: HOME | End: 2021-12-24
Attending: EMERGENCY MEDICINE
Payer: COMMERCIAL

## 2021-12-24 VITALS
BODY MASS INDEX: 38.41 KG/M2 | DIASTOLIC BLOOD PRESSURE: 92 MMHG | HEART RATE: 92 BPM | WEIGHT: 238.98 LBS | SYSTOLIC BLOOD PRESSURE: 169 MMHG | RESPIRATION RATE: 17 BRPM | OXYGEN SATURATION: 98 % | HEIGHT: 66 IN | TEMPERATURE: 98.4 F

## 2021-12-24 DIAGNOSIS — S86.011A STRAIN OF RIGHT ACHILLES TENDON, INITIAL ENCOUNTER: Primary | ICD-10-CM

## 2021-12-24 PROCEDURE — 99283 EMERGENCY DEPT VISIT LOW MDM: CPT

## 2021-12-24 ASSESSMENT — PAIN DESCRIPTION - PAIN TYPE
TYPE: ACUTE PAIN
TYPE: ACUTE PAIN

## 2021-12-24 ASSESSMENT — PAIN SCALES - GENERAL
PAINLEVEL_OUTOF10: 5
PAINLEVEL_OUTOF10: 5

## 2021-12-24 ASSESSMENT — PAIN DESCRIPTION - DESCRIPTORS: DESCRIPTORS: ACHING;SHARP

## 2021-12-24 ASSESSMENT — PAIN DESCRIPTION - ORIENTATION
ORIENTATION: RIGHT
ORIENTATION: RIGHT

## 2021-12-24 ASSESSMENT — PAIN DESCRIPTION - PROGRESSION
CLINICAL_PROGRESSION: NOT CHANGED
CLINICAL_PROGRESSION: NOT CHANGED

## 2021-12-24 ASSESSMENT — PAIN DESCRIPTION - FREQUENCY: FREQUENCY: CONTINUOUS

## 2021-12-24 NOTE — ED TRIAGE NOTES
While at work on Wednesday, 12/22/21, he was stepping down from his work truck and injured his right fool/heel when he stepped on wheel chock accidentally. States his ankle is fine. Feels he injured his hua's tendon.

## 2021-12-24 NOTE — ED PROVIDER NOTES
Triage Chief Complaint:   Foot Injury (While at work on Wednesday, 12/22/21, he was stepping down from his work truck and injured his right fool/heel when he stepped on wheel chock accidentally. States his ankle is fine. Feels he injured his hua's tendon.)      [place]:  Keyla Fatima is a 64 y.o. male that presents to the emergency department with right heel injury. He states this happened 2 days ago. He was at work stepping down from a truck. He states that he stepped on something that was at a bit of an incline. He had pain to the right heel after this. He states that he has been able to walk on it but it still hurts. He is concerned that he tore his Achilles tendon. He denies calf pain, numbness tingling in his foot. Past Medical History:   Diagnosis Date    Hypertension      History reviewed. No pertinent surgical history. Family History   Problem Relation Age of Onset    Diabetes Father     Heart Disease Father 61        MI    Diabetes Brother     Diabetes Maternal Grandfather     Diabetes Paternal Grandfather      Social History     Socioeconomic History    Marital status:      Spouse name: Not on file    Number of children: Not on file    Years of education: Not on file    Highest education level: Not on file   Occupational History    Not on file   Tobacco Use    Smoking status: Never Smoker    Smokeless tobacco: Never Used   Substance and Sexual Activity    Alcohol use: No    Drug use: No    Sexual activity: Not on file   Other Topics Concern    Not on file   Social History Narrative    Not on file     Social Determinants of Health     Financial Resource Strain:     Difficulty of Paying Living Expenses: Not on file   Food Insecurity:     Worried About Running Out of Food in the Last Year: Not on file    [place] of Food in the Last Year: Not on file   Transportation Needs:     Lack of Transportation (Medical): Not on file    Lack of Transportation (Non-Medical):  Not on file   Physical Activity:     Days of Exercise per Week: Not on file    Minutes of Exercise per Session: Not on file   Stress:     Feeling of Stress : Not on file   Social Connections:     Frequency of Communication with Friends and Family: Not on file    Frequency of Social Gatherings with Friends and Family: Not on file    Attends Mormon Services: Not on file    Active Member of [address] [place] [address] or Organizations: Not on file    Attends Club or Organization Meetings: Not on file    Marital Status: Not on file   Intimate Partner Violence:     Fear of Current or Ex-Partner: Not on file    Emotionally Abused: Not on file    Physically Abused: Not on file    Sexually Abused: Not on file   Housing Stability:     Unable to Pay for Housing in the Last Year: Not on file    Number of [place] in the Last Year: Not on file    Unstable Housing in the Last Year: Not on file     No current facility-administered medications for this encounter. Current Outpatient Medications   Medication Sig Dispense Refill    lisinopril-hydroCHLOROthiazide (PRINZIDE;ZESTORETIC) 20-12.5 MG per tablet TAKE ONE TABLET BY MOUTH EVERY DAY 90 tablet 3    aspirin 81 MG tablet Take 81 mg by mouth daily. No Known Allergies  Nursing Notes Reviewed    ROS:  At least 5 systems reviewed and otherwise negative except as in the [address] Physical Exam:  ED Triage Vitals [12/24/21 0850]   Enc Vitals Group      BP (!) 169/92      Pulse 92      Resp 17      Temp 98.4 °F (36.9 °C)      Temp Source Oral      SpO2 98 %      Weight 238 lb 15.7 oz (108.4 kg)      Height 5' 6\" (1.676 m)      Head Circumference       Peak Flow       Pain Score       Pain Loc       Pain Edu? Excl. in [address]? My pulse oximetry interpretation is which is within the normal range    GENERAL APPEARANCE: Awake and alert. Cooperative. No acute distress. HEAD:  Atraumatic. EYES: EOM's grossly intact. ENT: Mucous membranes are moist.  No trismus.   NECK: Trachea midline. EXTREMITIES: No acute deformities. Very mild tenderness palpation over the posterior heel and distal Achilles area. Full dorsiflexion and plantar flexion of the foot. Achilles is intact on palpation. No obvious swelling  SKIN: Warm and dry. NEUROLOGICAL: No gross facial drooping. Moves all 4 extremities spontaneously. PSYCHIATRIC: Normal mood. I have reviewed and interpreted all of the currently available lab results from this visit (if applicable):  No results found for this visit on 12/24/21. EKG: (All EKG's are interpreted by myself in the absence of a cardiologist)      MDM:  Patient here with concern for Achilles injury. He has full range of motion of his foot with good dorsiflexion plantar flexion and normal range of motion of the foot. His Achilles is intact on both palpation and range of motion exercises. He states that initially he had pain a little bit lower to his heel and that it was more to the left side of his Achilles area and now is more in the middle that is what he was concerned about. He likely has sprained tendon and even is developing some tendinitis. We discussed rest, ice, compression and elevation. This is a Worker's Comp. complaint patient will follow up with occupational health. Discharged home in good condition peer    Clinical Impression:  1. Strain of right Achilles tendon, initial encounter        Disposition Vitals:  [unfilled], [unfilled], [unfilled], [unfilled]    Disposition referral (if applicable):   [address] [facility] [phone]  Schedule an appointment as soon as possible for a visit   If symptoms worsen      Disposition medications (if applicable):  New Prescriptions    No medications on file         (Please note that portions of this note may have been completed with a voice recognition program. Efforts were made to edit the dictations but occasionally words are mis-transcribed.)    [place], MD Palma Espinosa MD  12/24/21 0874

## 2021-12-24 NOTE — Clinical Note
Sp Ramirez was seen and treated in our emergency department on 12/24/2021. He may return to work on 12/25/2021. If you have any questions or concerns, please don't hesitate to call.       Brittni Colorado MD

## 2022-03-04 ENCOUNTER — TELEPHONE (OUTPATIENT)
Dept: FAMILY MEDICINE CLINIC | Age: 62
End: 2022-03-04

## 2022-03-04 DIAGNOSIS — Z12.11 SCREEN FOR COLON CANCER: Primary | ICD-10-CM

## 2022-03-04 LAB
CONTROL: NORMAL
HEMOCCULT STL QL: POSITIVE

## 2022-03-04 PROCEDURE — 82274 ASSAY TEST FOR BLOOD FECAL: CPT | Performed by: FAMILY MEDICINE

## 2022-03-06 DIAGNOSIS — Z12.11 SCREEN FOR COLON CANCER: Primary | ICD-10-CM

## 2022-04-03 NOTE — PROGRESS NOTES
2022    Joanne Palm (:  1960) is a 64 y.o. male, here for evaluation of the following chief complaint(s):  Hypertension      ASSESSMENT/PLAN:     Diagnosis Orders   1. Primary hypertension  Renal Function Panel    at goal cont meds check renal       Return in about 6 months (around 10/4/2022) for screen lipid, screen psa, screen colon, HTN. An electronic signature was used to authenticate this note.     @SIG@    SUBJECTIVE/OBJECTIVE:  (NOTE : prior results listed below reviewed at this visit to assist in medical decision making.)    HPI / ROS    # HTN - ysabel meds no CP/SOB  BP Readings from Last 3 Encounters:   22 (!) 137/90   21 (!) 169/92   21 128/80     Lab Results   Component Value Date     2021    K 4.5 2021     2021    CO2 26 2021    BUN 18 2021    CREATININE 0.9 2021    GLUCOSE 114 2021    CALCIUM 9.8 2021       # Lipids - recent screening history:  Lab Results   Component Value Date    LDLCALC 104 (H) 2021     Lab Results   Component Value Date    TRIG 106 2021     Lab Results   Component Value Date    HDL 37 (L) 2021     Lab Results   Component Value Date    CHOL 162 2021     UTD    # PSA screening history: UTD  Lab Results   Component Value Date    PSA 0.56 2021    PSA 0.58 2019    PSA 0.53 2015             Wt Readings from Last 3 Encounters:   22 260 lb (117.9 kg)   21 238 lb 15.7 oz (108.4 kg)   21 233 lb 3.2 oz (105.8 kg)       BP Readings from Last 3 Encounters:   22 (!) 137/90   21 (!) 169/92   21 128/80       PHYSICAL EXAM  Vitals:    22 0956   BP: (!) 137/90   Site: Left Upper Arm   Position: Sitting   Cuff Size: Large Adult   Pulse: 73   Resp: 17   Temp: 98.6 °F (37 °C)   TempSrc: Oral   SpO2: 97%   Weight: 260 lb (117.9 kg)   Height: 5' 6\" (1.676 m)     A&o  Neck no TMG no bruit  Car reg no MGR  Lungs cta  Ext no edema  Skin no jaundice  Eyes anicteric

## 2022-04-04 ENCOUNTER — OFFICE VISIT (OUTPATIENT)
Dept: FAMILY MEDICINE CLINIC | Age: 62
End: 2022-04-04
Payer: COMMERCIAL

## 2022-04-04 VITALS
HEART RATE: 73 BPM | RESPIRATION RATE: 17 BRPM | HEIGHT: 66 IN | SYSTOLIC BLOOD PRESSURE: 137 MMHG | DIASTOLIC BLOOD PRESSURE: 90 MMHG | BODY MASS INDEX: 41.78 KG/M2 | OXYGEN SATURATION: 97 % | TEMPERATURE: 98.6 F | WEIGHT: 260 LBS

## 2022-04-04 DIAGNOSIS — I10 PRIMARY HYPERTENSION: Primary | ICD-10-CM

## 2022-04-04 LAB
ALBUMIN SERPL-MCNC: 4.7 G/DL (ref 3.4–5)
ANION GAP SERPL CALCULATED.3IONS-SCNC: 14 MMOL/L (ref 3–16)
BUN BLDV-MCNC: 20 MG/DL (ref 7–20)
CALCIUM SERPL-MCNC: 10.1 MG/DL (ref 8.3–10.6)
CHLORIDE BLD-SCNC: 104 MMOL/L (ref 99–110)
CO2: 23 MMOL/L (ref 21–32)
CREAT SERPL-MCNC: 0.9 MG/DL (ref 0.8–1.3)
GFR AFRICAN AMERICAN: >60
GFR NON-AFRICAN AMERICAN: >60
GLUCOSE BLD-MCNC: 103 MG/DL (ref 70–99)
PHOSPHORUS: 2.6 MG/DL (ref 2.5–4.9)
POTASSIUM SERPL-SCNC: 4.5 MMOL/L (ref 3.5–5.1)
SODIUM BLD-SCNC: 141 MMOL/L (ref 136–145)

## 2022-04-04 PROCEDURE — 99213 OFFICE O/P EST LOW 20 MIN: CPT | Performed by: FAMILY MEDICINE

## 2022-04-04 PROCEDURE — 36415 COLL VENOUS BLD VENIPUNCTURE: CPT | Performed by: FAMILY MEDICINE

## 2022-04-04 SDOH — ECONOMIC STABILITY: FOOD INSECURITY: WITHIN THE PAST 12 MONTHS, THE FOOD YOU BOUGHT JUST DIDN'T LAST AND YOU DIDN'T HAVE MONEY TO GET MORE.: NEVER TRUE

## 2022-04-04 SDOH — ECONOMIC STABILITY: FOOD INSECURITY: WITHIN THE PAST 12 MONTHS, YOU WORRIED THAT YOUR FOOD WOULD RUN OUT BEFORE YOU GOT MONEY TO BUY MORE.: NEVER TRUE

## 2022-04-04 ASSESSMENT — PATIENT HEALTH QUESTIONNAIRE - PHQ9
SUM OF ALL RESPONSES TO PHQ QUESTIONS 1-9: 0
SUM OF ALL RESPONSES TO PHQ QUESTIONS 1-9: 0
1. LITTLE INTEREST OR PLEASURE IN DOING THINGS: 0
SUM OF ALL RESPONSES TO PHQ9 QUESTIONS 1 & 2: 0
2. FEELING DOWN, DEPRESSED OR HOPELESS: 0
SUM OF ALL RESPONSES TO PHQ QUESTIONS 1-9: 0
SUM OF ALL RESPONSES TO PHQ QUESTIONS 1-9: 0

## 2022-04-04 ASSESSMENT — SOCIAL DETERMINANTS OF HEALTH (SDOH): HOW HARD IS IT FOR YOU TO PAY FOR THE VERY BASICS LIKE FOOD, HOUSING, MEDICAL CARE, AND HEATING?: NOT HARD AT ALL

## 2022-10-03 ENCOUNTER — OFFICE VISIT (OUTPATIENT)
Dept: FAMILY MEDICINE CLINIC | Age: 62
End: 2022-10-03
Payer: COMMERCIAL

## 2022-10-03 VITALS
SYSTOLIC BLOOD PRESSURE: 126 MMHG | RESPIRATION RATE: 16 BRPM | HEART RATE: 92 BPM | WEIGHT: 234 LBS | DIASTOLIC BLOOD PRESSURE: 78 MMHG | BODY MASS INDEX: 37.77 KG/M2 | OXYGEN SATURATION: 97 %

## 2022-10-03 DIAGNOSIS — Z00.00 ROUTINE GENERAL MEDICAL EXAMINATION AT A HEALTH CARE FACILITY: Primary | ICD-10-CM

## 2022-10-03 DIAGNOSIS — R73.03 PREDIABETES: ICD-10-CM

## 2022-10-03 DIAGNOSIS — Z12.11 SCREEN FOR COLON CANCER: ICD-10-CM

## 2022-10-03 DIAGNOSIS — Z13.220 SCREENING, LIPID: ICD-10-CM

## 2022-10-03 DIAGNOSIS — Z12.5 SCREENING PSA (PROSTATE SPECIFIC ANTIGEN): ICD-10-CM

## 2022-10-03 DIAGNOSIS — I10 PRIMARY HYPERTENSION: ICD-10-CM

## 2022-10-03 LAB
ANION GAP SERPL CALCULATED.3IONS-SCNC: 12 MMOL/L (ref 3–16)
BUN BLDV-MCNC: 23 MG/DL (ref 7–20)
CALCIUM SERPL-MCNC: 9.5 MG/DL (ref 8.3–10.6)
CHLORIDE BLD-SCNC: 103 MMOL/L (ref 99–110)
CHOLESTEROL, TOTAL: 177 MG/DL (ref 0–199)
CO2: 26 MMOL/L (ref 21–32)
CREAT SERPL-MCNC: 0.9 MG/DL (ref 0.8–1.3)
GFR AFRICAN AMERICAN: >60
GFR NON-AFRICAN AMERICAN: >60
GLUCOSE BLD-MCNC: 160 MG/DL (ref 70–99)
HDLC SERPL-MCNC: 34 MG/DL (ref 40–60)
LDL CHOLESTEROL CALCULATED: 118 MG/DL
POTASSIUM SERPL-SCNC: 4.4 MMOL/L (ref 3.5–5.1)
SODIUM BLD-SCNC: 141 MMOL/L (ref 136–145)
TRIGL SERPL-MCNC: 126 MG/DL (ref 0–150)
VLDLC SERPL CALC-MCNC: 25 MG/DL

## 2022-10-03 PROCEDURE — 99396 PREV VISIT EST AGE 40-64: CPT | Performed by: FAMILY MEDICINE

## 2022-10-03 PROCEDURE — 36415 COLL VENOUS BLD VENIPUNCTURE: CPT | Performed by: FAMILY MEDICINE

## 2022-10-03 ASSESSMENT — PATIENT HEALTH QUESTIONNAIRE - PHQ9
SUM OF ALL RESPONSES TO PHQ QUESTIONS 1-9: 0
SUM OF ALL RESPONSES TO PHQ9 QUESTIONS 1 & 2: 0
1. LITTLE INTEREST OR PLEASURE IN DOING THINGS: 0
2. FEELING DOWN, DEPRESSED OR HOPELESS: 0

## 2022-10-03 NOTE — PATIENT INSTRUCTIONS
Low Carb Eating with Intermittent Fasting    target < 100 grams of carb daily; ZERO grained based carbs  Get only carbs from whole fruits - strawberries, raspberries, blackberries, apples, oranges, pineapples, bananas etc.    Intermittent Fasting (\"I. F. \") / Eating Window   Only eat between noon and 8 PM  Water any time  Coffee with cream and no more than 1 teaspoon sugar OK in AM    Google/ YouTube AUTOPHAGY - a primary benefit of IF    Other helpful books and websites  1) Wheat Belly by Riki Townsend MD (www.Twitpay. [employer])  2) The Primal Blueprint by Eric Kaur (www.MarksDailyApple. com - review success stories)  2) Living Paleo For Dummies

## 2022-10-04 LAB
ESTIMATED AVERAGE GLUCOSE: 114 MG/DL
HBA1C MFR BLD: 5.6 %
PROSTATE SPECIFIC ANTIGEN: 0.57 NG/ML (ref 0–4)

## 2023-01-18 ENCOUNTER — TELEPHONE (OUTPATIENT)
Dept: FAMILY MEDICINE CLINIC | Age: 63
End: 2023-01-18

## 2023-01-18 NOTE — TELEPHONE ENCOUNTER
Pt called states that he has had lower left abdominal pain for about 2 weeks. Offered pt an appt for 01-19-23 with fco. Pt declined and states that he wants to come in on Monday to see dr Tiana Pollard.

## 2023-01-20 RX ORDER — LISINOPRIL AND HYDROCHLOROTHIAZIDE 20; 12.5 MG/1; MG/1
TABLET ORAL
Qty: 90 TABLET | Refills: 3 | Status: SHIPPED | OUTPATIENT
Start: 2023-01-20

## 2023-01-20 NOTE — TELEPHONE ENCOUNTER
Medication:   Requested Prescriptions     Pending Prescriptions Disp Refills    lisinopril-hydroCHLOROthiazide (PRINZIDE;ZESTORETIC) 20-12.5 MG per tablet [Pharmacy Med Name: LISINOPRIL-HCTZ 20-12.5 MG TAB] 90 tablet 3     Sig: TAKE ONE TABLET BY MOUTH DAILY        Last Filled:  08/23/2021    Patient Phone Number: [phone] (home) [phone] (work)    Last appt: 10/3/2022   Next appt: 1/23/2023    Last OARRS: No flowsheet data found.

## 2023-01-22 RX ORDER — AMOXICILLIN AND CLAVULANATE POTASSIUM 875; 125 MG/1; MG/1
1 TABLET, FILM COATED ORAL 2 TIMES DAILY
Qty: 14 TABLET | Refills: 0 | Status: CANCELLED | OUTPATIENT
Start: 2023-01-22 | End: 2023-01-29

## 2023-01-22 NOTE — PROGRESS NOTES
2023    Francine Haywood (:  1960) is a 58 y.o. male, here for evaluation of the following chief complaint(s):  Testicle Pain (Left side started Wednesday afternoon)      ASSESSMENT/PLAN:     Diagnosis Orders   1. Left inguinal pain  Rafael Bryan MD, General Surgery, [facility]    concern for non reducible inguinal hernia; not torsed testicle; ref Dr Corinna Mccurdy for opinion          Return if symptoms worsen or fail to improve. An electronic signature was used to authenticate this note. SUBJECTIVE/OBJECTIVE:  (NOTE : prior results listed below reviewed at this visit to assist in medical decision making.)    HPI / ROS    He noted acute onset strain with heavy lifting working on car 2 weeks ago    # lower left side abd pain aching x 2 weeks no fever  or chills; no n/v; no CP? SOB. Stools normal not bloody not black not pale not diarrhea; appetite same. No hematuria n o dysuria.        Concern for serious surgical issue is d/w pt :  re: SBO, abscess, ureteral involvement; etc; decision re: hospitalization is made and based on the presentation can trial manage outpt with promtp action for any worsening          Wt Readings from Last 3 Encounters:   23 232 lb (105.2 kg)   10/03/22 234 lb (106.1 kg)   22 260 lb (117.9 kg)       BP Readings from Last 3 Encounters:   23 124/78   10/03/22 126/78   22 (!) 137/90       PHYSICAL EXAM  Vitals:    23 1013   BP: 124/78   Site: Right Upper Arm   Position: Sitting   Cuff Size: Large Adult   Pulse: 68   Resp: 16   SpO2: 97%   Weight: 232 lb (105.2 kg)     A&o  Abd - NT LLQ NR NG NM  Testicle left side not elevated cremasteric reflex intact; however thickening of entire left inguional canal without impulse

## 2023-01-23 ENCOUNTER — HOSPITAL ENCOUNTER (EMERGENCY)
Age: 63
Discharge: HOME | End: 2023-01-24
Attending: EMERGENCY MEDICINE
Payer: COMMERCIAL

## 2023-01-23 ENCOUNTER — OFFICE VISIT (OUTPATIENT)
Dept: FAMILY MEDICINE CLINIC | Age: 63
End: 2023-01-23
Payer: COMMERCIAL

## 2023-01-23 VITALS
SYSTOLIC BLOOD PRESSURE: 124 MMHG | DIASTOLIC BLOOD PRESSURE: 78 MMHG | RESPIRATION RATE: 16 BRPM | WEIGHT: 232 LBS | HEART RATE: 68 BPM | BODY MASS INDEX: 37.45 KG/M2 | OXYGEN SATURATION: 97 %

## 2023-01-23 DIAGNOSIS — S49.92XA INJURY OF LEFT SHOULDER, INITIAL ENCOUNTER: ICD-10-CM

## 2023-01-23 DIAGNOSIS — R10.32 LEFT INGUINAL PAIN: Primary | ICD-10-CM
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
## 2023-01-23 PROCEDURE — 96372 THER/PROPH/DIAG INJ SC/IM: CPT

## 2023-01-23 PROCEDURE — 3074F SYST BP LT 130 MM HG: CPT | Performed by: FAMILY MEDICINE

## 2023-01-23 PROCEDURE — 99214 OFFICE O/P EST MOD 30 MIN: CPT | Performed by: FAMILY MEDICINE

## 2023-01-23 PROCEDURE — 99284 EMERGENCY DEPT VISIT MOD MDM: CPT

## 2023-01-23 PROCEDURE — 3078F DIAST BP <80 MM HG: CPT | Performed by: FAMILY MEDICINE

## 2023-01-23 ASSESSMENT — PAIN DESCRIPTION - ORIENTATION: ORIENTATION: LEFT

## 2023-01-23 ASSESSMENT — PAIN DESCRIPTION - LOCATION: LOCATION: SHOULDER

## 2023-01-23 ASSESSMENT — PATIENT HEALTH QUESTIONNAIRE - PHQ9
SUM OF ALL RESPONSES TO PHQ QUESTIONS 1-9: 0
2. FEELING DOWN, DEPRESSED OR HOPELESS: 0
SUM OF ALL RESPONSES TO PHQ QUESTIONS 1-9: 0
SUM OF ALL RESPONSES TO PHQ9 QUESTIONS 1 & 2: 0
1. LITTLE INTEREST OR PLEASURE IN DOING THINGS: 0

## 2023-01-23 ASSESSMENT — PAIN - FUNCTIONAL ASSESSMENT
PAIN_FUNCTIONAL_ASSESSMENT: 0-10
PAIN_FUNCTIONAL_ASSESSMENT: PREVENTS OR INTERFERES WITH ALL ACTIVE AND SOME PASSIVE ACTIVITIES

## 2023-01-23 ASSESSMENT — PAIN SCALES - GENERAL: PAINLEVEL_OUTOF10: 2

## 2023-01-23 ASSESSMENT — LIFESTYLE VARIABLES: HOW OFTEN DO YOU HAVE A DRINK CONTAINING ALCOHOL: NEVER

## 2023-01-23 ASSESSMENT — PAIN DESCRIPTION - DESCRIPTORS: DESCRIPTORS: ACHING

## 2023-01-23 ASSESSMENT — PAIN DESCRIPTION - FREQUENCY: FREQUENCY: CONTINUOUS

## 2023-01-23 ASSESSMENT — PAIN DESCRIPTION - PAIN TYPE: TYPE: ACUTE PAIN

## 2023-01-23 NOTE — Clinical Note
Juan David Kristal was seen and treated in our emergency department on 1/23/2023. He may return to work on . If you have any questions or concerns, please don't hesitate to call.       Jarrod Shaikh MD

## 2023-01-23 NOTE — Clinical Note
Jazzmine Deleon was seen and treated in our emergency department on 1/23/2023. He may return to work on 01/26/2023. Mr. Anabela Medrano may need to have restricted use of his left arm for an additional 1 week following return to work. If you have any questions or concerns, please don't hesitate to call.       Christoph Soriano MD

## 2023-01-23 NOTE — Clinical Note
Violeta Reeves was seen and treated in our emergency department on 1/23/2023. He may return to work on 01/26/2023. Mr. Coral Abrams may need to have restricted use of his left arm for an additional 1 week following return to work. If you have any questions or concerns, please don't hesitate to call.       Aime Dawn MD

## 2023-01-24 ENCOUNTER — APPOINTMENT (OUTPATIENT)
Dept: GENERAL RADIOLOGY | Age: 63
End: 2023-01-24
Payer: COMMERCIAL

## 2023-01-24 ENCOUNTER — TELEPHONE (OUTPATIENT)
Dept: ORTHOPEDIC SURGERY | Age: 63
End: 2023-01-24

## 2023-01-24 ENCOUNTER — OFFICE VISIT (OUTPATIENT)
Dept: ORTHOPEDIC SURGERY | Age: 63
End: 2023-01-24

## 2023-01-24 VITALS
TEMPERATURE: 97.3 F | WEIGHT: 240.08 LBS | OXYGEN SATURATION: 97 % | RESPIRATION RATE: 17 BRPM | BODY MASS INDEX: 38.58 KG/M2 | SYSTOLIC BLOOD PRESSURE: 135 MMHG | HEIGHT: 66 IN | DIASTOLIC BLOOD PRESSURE: 86 MMHG | HEART RATE: 85 BPM

## 2023-01-24 VITALS — HEIGHT: 66 IN | BODY MASS INDEX: 37.45 KG/M2 | WEIGHT: 233 LBS

## 2023-01-24 DIAGNOSIS — S43.402A SPRAIN OF LEFT SHOULDER, UNSPECIFIED SHOULDER SPRAIN TYPE, INITIAL ENCOUNTER: Primary | ICD-10-CM

## 2023-01-24 PROCEDURE — 73030 X-RAY EXAM OF SHOULDER: CPT

## 2023-01-24 PROCEDURE — 6360000002 HC RX W HCPCS: Performed by: EMERGENCY MEDICINE

## 2023-01-24 PROCEDURE — 6370000000 HC RX 637 (ALT 250 FOR IP): Performed by: EMERGENCY MEDICINE

## 2023-01-24 RX ORDER — ACETAMINOPHEN 500 MG
1000 TABLET ORAL ONCE
Status: COMPLETED | OUTPATIENT
Start: 2023-01-24 | End: 2023-01-24

## 2023-01-24 RX ORDER — LIDOCAINE 4 G/G
1 PATCH TOPICAL DAILY
Qty: 7 EACH | Refills: 0 | Status: SHIPPED | OUTPATIENT
Start: 2023-01-24 | End: 2023-01-31

## 2023-01-24 RX ORDER — KETOROLAC TROMETHAMINE 30 MG/ML
15 INJECTION, SOLUTION INTRAMUSCULAR; INTRAVENOUS ONCE
Status: COMPLETED | OUTPATIENT
Start: 2023-01-24 | End: 2023-01-24

## 2023-01-24 RX ORDER — LIDOCAINE 4 G/G
2 PATCH TOPICAL ONCE
Status: DISCONTINUED | OUTPATIENT
Start: 2023-01-24 | End: 2023-01-24 | Stop reason: HOSPADM

## 2023-01-24 RX ORDER — ACETAMINOPHEN 500 MG
1000 TABLET ORAL 3 TIMES DAILY
Qty: 42 TABLET | Refills: 0 | Status: SHIPPED | OUTPATIENT
Start: 2023-01-24 | End: 2023-01-31

## 2023-01-24 RX ORDER — MELOXICAM 7.5 MG/1
7.5 TABLET ORAL DAILY
Qty: 10 TABLET | Refills: 0 | Status: SHIPPED | OUTPATIENT
Start: 2023-01-24 | End: 2023-02-03

## 2023-01-24 RX ORDER — METHOCARBAMOL 500 MG/1
500 TABLET, FILM COATED ORAL NIGHTLY PRN
Qty: 10 TABLET | Refills: 0 | Status: SHIPPED | OUTPATIENT
Start: 2023-01-24 | End: 2023-02-03

## 2023-01-24 RX ADMIN — KETOROLAC TROMETHAMINE 15 MG: 30 INJECTION, SOLUTION INTRAMUSCULAR at 00:15

## 2023-01-24 RX ADMIN — ACETAMINOPHEN 1000 MG: 500 TABLET ORAL at 00:17

## 2023-01-24 ASSESSMENT — PAIN SCALES - GENERAL
PAINLEVEL_OUTOF10: 3
PAINLEVEL_OUTOF10: 3
PAINLEVEL_OUTOF10: 2
PAINLEVEL_OUTOF10: 3

## 2023-01-24 ASSESSMENT — PAIN DESCRIPTION - LOCATION: LOCATION: SHOULDER

## 2023-01-24 ASSESSMENT — PAIN - FUNCTIONAL ASSESSMENT
PAIN_FUNCTIONAL_ASSESSMENT: 0-10
PAIN_FUNCTIONAL_ASSESSMENT: 0-10

## 2023-01-24 ASSESSMENT — PAIN DESCRIPTION - ORIENTATION: ORIENTATION: LEFT

## 2023-01-24 ASSESSMENT — PAIN DESCRIPTION - PAIN TYPE: TYPE: ACUTE PAIN

## 2023-01-24 NOTE — ED TRIAGE NOTES
Pt ambulatory to ED with complaint of left shoulder injury. Pt states he was at work and slipped on the ice and fell, landing on his left shoulder. States pain in left anterior shoulder, pain \"not too bad while I'm not moving it\". States pain becomes much worse when he tries to move left arm forward. No redness, edema noted to shoulder, left arm warm to touch with brisk capillary refill, + radial pulse. Ice pack applied to left shoulder.

## 2023-01-24 NOTE — ED NOTES
Pt states he is able to lift left arm backwards and over his head and the pain is better with these movements, states still very painful to move left arm forward.      Velma Baez RN  01/24/23 0151

## 2023-01-24 NOTE — DISCHARGE INSTRUCTIONS
[facility].    Avoid other NSAID medications (ex: Ibuprofen, Advil, Motrin, Naproxen, Aleve, etc.) for the next 10 days as they can interact with the medicine we are prescribing for you.    Be sure to contact the clinics listed in your paperwork (or another local orthopaedics clinic) to arrange for a follow up visit.    If you have any new or worsening issues after going home don't hesitate to return here for reevaluation at any time 24/7!

## 2023-01-24 NOTE — LETTER
[facility]  [facility] [address] [address]  Phone: [phone]  Fax: [phone]    Nilam Franco MD        January 24, 2023     Patient: Yaneth Heller   YOB: 1960   Date of Visit: 1/24/2023       To Whom It May Concern: It is my medical opinion that [place]  may return to light duty immediately with the following restrictions: no use of the left upper extremity until follow up after MRI. If you have any questions or concerns, please don't hesitate to call.     Sincerely,          Nilam Franco MD

## 2023-01-24 NOTE — PROGRESS NOTES
Nick Newton  [id number]  January 24, 2023    Chief Complaint   Patient presents with    Shoulder Injury     Left shoulder injury from a fall at work on 1/23/23           History: The patient is a 60-year-old gentleman who is here for evaluation of his left shoulder. This is a Worker's Compensation injury. The patient reportedly works at "[employer]. He works as a  and drives semi truck's. He reportedly slipped on ice yesterday and landed onto his left outstretched upper extremity. He is right-hand dominant. The patient reports having a remote left shoulder injury nearly 7 years ago and that resolved. He presented to the emergency room and was given Mobic and Robaxin. He has been wearing a sling. This is a consult from Wilfrido Moreno MD for left shoulder pain. The patient's  past medical history, medications, allergies,  family history, social history, and review of systems have been reviewed, and dated and are recorded in the chart. Pertinent items are noted in HPI. Review of systems reviewed from Pertinent History Form dated on 1/24 and available in the patient's chart under the Media tab. Ht 5' 6\" (1.676 m)   Wt 233 lb (105.7 kg)   BMI 37.61 kg/m²     Physical: The patient presents today in no acute distress. He is alert and oriented to person, place and time. He displays appropriate mood and affect. He is well dressed, nourished and  groomed. He has a normal affect. Examination of the neck reveals no evidence of tenderness. Spurling's sign is negative. Active range of motion of the left shoulder is: 75 degrees abduction, 75 degrees forward flexion, 25 degrees of external rotation and internal rotation to L5. Passive range of motion of the left shoulder is: 160 degrees abduction, 160 degrees forward flexion, 30 degrees of external rotation and internal rotation to L4. Active and passive range of motion of the opposite shoulder is full.  Examination of the left shoulder reveals positive Neer and Rangel' impingement signs. There is mild subacromial crepitus with range of motion. Drop arm test is positive on the left. Speed's test is negative. There is no evidence of tenderness over the Bicipital groove. He  does have tenderness over the [facility] joint. Cross body adduction test is positive. He has moderate tenderness over the subacromial space. There is no evidence of scapular winging. Lift off sign is negative. There is no evidence of atrophy. External rotation strength is 3/5 on the left. There are no skin lesions, cellulitis, or extreme edema in the upper extremities. Sensation is intact to axillary, median, radial, and ulnar nerves bilaterally. The patient has warm and well-perfused bilateral upper extremities with brisk capillary refill. Radial and ulnar pulses are palpable and 2+ bilaterally. X-rays: 3 views of the left shoulder obtained yesterday were extensively reviewed. The x-rays confirm a subtle hooking to the acromion. There is moderate AC joint arthritis. Impression: 1) left shoulder sprain, rule out rotator cuff tear  2) left shoulder AC joint arthritis    Plan: At this time, we will obtain an MRI scan of the left shoulder. I am quite concerned about a rotator cuff tear. The patient was encouraged to modify his activities. He will work on light range of motion. The patient was given a note for light duty with minimal use of his left upper extremity. No orders of the defined types were placed in this encounter.

## 2023-01-25 ENCOUNTER — OFFICE VISIT (OUTPATIENT)
Dept: SURGERY | Age: 63
End: 2023-01-25
Payer: COMMERCIAL

## 2023-01-25 VITALS — BODY MASS INDEX: 37.61 KG/M2 | WEIGHT: 233 LBS

## 2023-01-25 DIAGNOSIS — K40.90 LEFT INGUINAL HERNIA: Primary | ICD-10-CM

## 2023-01-25 PROCEDURE — 99204 OFFICE O/P NEW MOD 45 MIN: CPT | Performed by: SURGERY

## 2023-01-25 ASSESSMENT — ENCOUNTER SYMPTOMS: ABDOMINAL PAIN: 1

## 2023-01-25 NOTE — PROGRESS NOTES
Subjective:      Patient ID: Nash Louis is a 58 y.o. male. HPI  Chief Complaint: possible hernia  Patient referred by Dr. Ten Jensen for evaluation of hernia. Patient reports symptoms of pain radiating to testicle. Location of symptoms is left groin. Symptoms were first noted last week, no inciting events. Alleviated by rest, time. Symptoms aggravated by prolonged standing, movements. Previous evaluation includes exam by PCP. Awaiting MRI for possible rotator cuff tear. Workman's comp waiting to approve. Patient has a history of HTN. Will plan following treatment: left inguinal hernia repair, consider co surgery with ortho if needed. Sooner if pain worsens. Past Medical History:   Diagnosis Date    Class 2 obesity     Hypertension        No past surgical history on file. Current Outpatient Medications   Medication Sig Dispense Refill    meloxicam (MOBIC) 7.5 MG tablet Take 1 tablet by mouth daily for 10 days 10 tablet 0    acetaminophen (TYLENOL) 500 MG tablet Take 2 tablets by mouth in the morning, at noon, and at bedtime for 7 days 42 tablet 0    lidocaine 4 % external patch Place 1 patch onto the skin daily for 7 days 7 each 0    methocarbamol (ROBAXIN) 500 MG tablet Take 1 tablet by mouth nightly as needed (Pain or stiffness not relieved by your other medications) 10 tablet 0    lisinopril-hydroCHLOROthiazide (PRINZIDE;ZESTORETIC) 20-12.5 MG per tablet TAKE ONE TABLET BY MOUTH DAILY 90 tablet 3    aspirin 81 MG tablet Take 81 mg by mouth daily. No current facility-administered medications for this visit. Prior to Admission medications    Medication Sig Start Date End Date Taking?  Authorizing Provider   meloxicam (MOBIC) 7.5 MG tablet Take 1 tablet by mouth daily for 10 days 1/24/23 2/3/23 Yes Jacqui Hutchinson MD   acetaminophen (TYLENOL) 500 MG tablet Take 2 tablets by mouth in the morning, at noon, and at bedtime for 7 days 1/24/23 1/31/23 Yes Jacqui Hutchinson MD lidocaine 4 % external patch Place 1 patch onto the skin daily for 7 days 1/24/23 1/31/23 Yes Pamella Zheng MD   methocarbamol (ROBAXIN) 500 MG tablet Take 1 tablet by mouth nightly as needed (Pain or stiffness not relieved by your other medications) 1/24/23 2/3/23 Yes Pamella Zheng MD   lisinopril-hydroCHLOROthiazide (PRINZIDE;ZESTORETIC) 20-12.5 MG per tablet TAKE ONE TABLET BY MOUTH DAILY 1/20/23  Yes Donavon Zamudio MD   aspirin 81 MG tablet Take 81 mg by mouth daily. Yes Historical Provider, MD         No Known Allergies    Social History     Socioeconomic History    Marital status:      Spouse name: Not on file    Number of children: Not on file    Years of education: Not on file    Highest education level: Not on file   Occupational History    Not on file   Tobacco Use    Smoking status: Never    Smokeless tobacco: Never   Substance and Sexual Activity    Alcohol use: No    Drug use: No    Sexual activity: Not Currently   Other Topics Concern    Not on file   Social History Narrative    Not on file     Social Determinants of Health     Financial Resource Strain: Low Risk     Difficulty of Paying Living Expenses: Not hard at all   Food Insecurity: No Food Insecurity    Worried About Running Out of Food in the Last Year: Never true    Ran Out of Food in the Last Year: Never true   Transportation Needs: Not on file   Physical Activity: Not on file   Stress: Not on file   Social Connections: Not on file   Intimate Partner Violence: Not on file   Housing Stability: Not on file       Family History   Problem Relation Age of Onset    Diabetes Father     Coronary Art Dis Father 61    Hypertension Father     Diabetes Brother     Diabetes Maternal Grandfather     Diabetes Paternal Grandfather        Review of Systems   Constitutional: Negative. Gastrointestinal:  Positive for abdominal pain. Genitourinary:  Positive for testicular pain.    All other systems reviewed and are negative. Objective:   Physical Exam  Vitals reviewed. Constitutional:       General: He is not in acute distress. Appearance: Normal appearance. He is well-developed. He is not diaphoretic. HENT:      Head: Normocephalic and atraumatic. Right Ear: External ear normal.      Left Ear: External ear normal.   Eyes:      Conjunctiva/sclera: Conjunctivae normal.      Pupils: Pupils are equal, round, and reactive to light. Neck:      Trachea: Trachea normal.   Cardiovascular:      Rate and Rhythm: Normal rate and regular rhythm. Heart sounds: Normal heart sounds, S1 normal and S2 normal.   Pulmonary:      Effort: Pulmonary effort is normal. No respiratory distress. Breath sounds: Normal breath sounds. No wheezing. Abdominal:      General: There is no distension. Palpations: Abdomen is soft. Hernia: A hernia (left inguinal) is present. Musculoskeletal:         General: Normal range of motion. Cervical back: Normal range of motion and neck supple. Skin:     General: Skin is warm and dry. Findings: No erythema. Neurological:      Mental Status: He is alert and oriented to person, place, and time. Psychiatric:         Behavior: Behavior normal. Behavior is cooperative. Thought Content: Thought content normal.         Judgment: Judgment normal.       Assessment:       Diagnosis Orders   1. Left inguinal hernia                Plan:      Recommend left inguinal hernia repair  The risks, benefits and alternatives to the planned procedure were discussed. Patient expressed an understanding and is willing to proceed. Will await MRI shoulder as I would be ok for co-surgery if needs rotator cuff repair.   Consider hernia repair sooner if pain worsens        Adalberto Young MD

## 2023-01-26 ASSESSMENT — ENCOUNTER SYMPTOMS
SHORTNESS OF BREATH: 0
NAUSEA: 0
BACK PAIN: 0
ABDOMINAL PAIN: 0
VOMITING: 0

## 2023-01-26 NOTE — ED PROVIDER NOTES
[facility]    Name: Nash Louis : 1960 MRN: [id number] Date of Service: 2023    /86   Pulse 85   Temp 97.3 °F (36.3 °C) (Oral)   Resp 17   Ht 5' 6\" (1.676 m)   Wt 240 lb 1.3 oz (108.9 kg)   SpO2 97%   BMI 38.75 kg/m²     CC: fall, shoulder pain    HPI: this patient is a very pleasant 58 y.o. male presenting to the ED from home. Mr. Taz Blank tells me that just prior to arrival here he was walking outdoors at his place of work when he \"slipped on a patch of ice\" and fell to the ground, landing on his left shoulder. He does not believe that he struck his head against the ground, nor does he think that he lost consciousness. He was quickly able to rise back up to a standing position independently and continue walking. Almost immediately he developed intense, aching and throbbing pains to the top of his left shoulder. He was concerned that he may have injured his shoulder significantly, prompting him to come to this department to be evaluated. On arrival here he reports ongoing, moderate to severe pain to his left shoulder, which is exacerbated by any movements of his left upper extremity. He has not appreciated any other changes from his usual state of health and he denies other current complaints. Specifically, he has not noticed any associated weakness, numbness, or difficulty moving any portion of his left arm, nor has he noticed significant pain or soreness to any other areas of his body. _____________________________________________________________________    Past Medical History:   Diagnosis Date    Class 2 obesity     Hypertension        History reviewed. No pertinent surgical history.     Social History     Tobacco Use    Smoking status: Never    Smokeless tobacco: Never   Vaping Use    Vaping Use: Never used   Substance Use Topics    Alcohol use: No    Drug use: No       Family History   Problem Relation Age of Onset    Diabetes Father     Coronary Art Dis Father 61    Hypertension Father     Diabetes Brother     Diabetes Maternal Grandfather     Diabetes Paternal Grandfather      _____________________________________________________________________    Review of Systems   Constitutional:  Negative for chills and fever. HENT:  Negative for hearing loss and tinnitus. Eyes:  Negative for visual disturbance. Respiratory:  Negative for shortness of breath. Cardiovascular:  Negative for chest pain. Gastrointestinal:  Negative for abdominal pain, nausea and vomiting. Genitourinary:  Negative for flank pain. Musculoskeletal:  Positive for arthralgias. Negative for back pain, gait problem, joint swelling, myalgias, neck pain and neck stiffness. Skin:  Negative for rash and wound. Neurological:  Negative for syncope, weakness, numbness and headaches. Hematological:  Does not bruise/bleed easily. Psychiatric/Behavioral:  Negative for confusion. Physical Exam  Constitutional:       General: He is awake. He is not in acute distress. Appearance: He is not ill-appearing, toxic-appearing or diaphoretic. HENT:      Head: Normocephalic and atraumatic. Eyes:      Extraocular Movements: Extraocular movements intact. Conjunctiva/sclera: Conjunctivae normal.      Pupils: Pupils are equal, round, and reactive to light. Neck:      Vascular: No JVD. Cardiovascular:      Rate and Rhythm: Normal rate and regular rhythm. Pulses:           Radial pulses are 2+ on the right side and 2+ on the left side. Heart sounds: Normal heart sounds. No murmur heard. Pulmonary:      Effort: Pulmonary effort is normal. No respiratory distress. Breath sounds: Normal breath sounds. Abdominal:      General: There is no distension. Palpations: Abdomen is soft. Tenderness: There is no abdominal tenderness. There is no guarding or rebound. Musculoskeletal:      Right shoulder: No swelling, deformity or tenderness.       Left shoulder: Tenderness and bony tenderness present. No swelling, deformity or crepitus. Normal range of motion. Right upper arm: No swelling, deformity or tenderness. Left upper arm: No swelling, deformity or tenderness. Right elbow: No swelling or deformity. No tenderness. Left elbow: No swelling or deformity. No tenderness. Right forearm: No swelling, deformity or tenderness. Left forearm: No swelling, deformity or tenderness. Right wrist: No swelling, deformity or tenderness. Left wrist: No swelling, deformity or tenderness. Right hand: No swelling, deformity or tenderness. Left hand: No swelling, deformity or tenderness. Normal strength. Normal strength of finger abduction, thumb/finger opposition and wrist extension. Normal sensation. Normal sensation of the ulnar distribution, median distribution and radial distribution. Cervical back: No swelling, deformity, tenderness or crepitus. No pain with movement. Normal range of motion. Thoracic back: No swelling, deformity or tenderness. Lumbar back: No swelling or deformity. Right hip: No deformity, tenderness or crepitus. Normal range of motion. Left hip: No deformity, tenderness or crepitus. Normal range of motion. Right upper leg: No swelling, deformity or tenderness. Left upper leg: No swelling, deformity or tenderness. Right knee: No swelling or deformity. No tenderness. Normal alignment. Left knee: No swelling or deformity. No tenderness. Normal alignment. Right lower leg: No swelling, deformity or tenderness. Left lower leg: No swelling, deformity or tenderness. Right ankle: No swelling or deformity. No tenderness. Left ankle: No swelling or deformity. No tenderness. Right foot: No swelling, deformity or tenderness. Left foot: No swelling, deformity or tenderness. Skin:     General: Skin is warm and dry.       Coloration: Skin is not cyanotic, mottled or pale. Findings: No abrasion, bruising, ecchymosis, laceration or wound. Comments: No overt, external signs of acute trauma appreciated. Neurological:      Mental Status: He is alert and oriented to person, place, and time. GCS: GCS eye subscore is 4. GCS verbal subscore is 5. GCS motor subscore is 6. Cranial Nerves: Cranial nerves 2-12 are intact. No cranial nerve deficit. Sensory: Sensation is intact. No sensory deficit. Motor: Motor function is intact. No weakness. Psychiatric:         Speech: Speech normal.         Behavior: Behavior normal.     Full active and passive range of motion of the left shoulder in flexion, extension, abduction and abduction. TTP to the area of the left acromioclavicular joint but without associated deformity to the site. Shoulders are fairly symmetric in appearance. Reproducible pain to the area of the left [facility] joint with essentially any movement of the left shoulder. No posterior shoulder pain with internal or external rotation of the LUE against resistance. 5/5 strength at the left elbow in flexion and extension. No strength discrepancy when comparing flexion or abduction movements of the left and right shoulders. _____________________________________________________________________    RESULTS:    XR SHOULDER LEFT (MIN 2 VIEWS)   Final Result   No acute osseous or soft tissue abnormality. _____________________________________________________________________    Is this patient to be included in the SEP-1 Core Measure? No (no infection suspected)  _____________________________________________________________________    MEDICAL DECISION MAKING & PLANS:    I reviewed the patient's recent and/or pertinent records, current medications, triage notes, allergies, and vital signs.     Differential Diagnosis:  Fall from standing  Left shoulder fracture   Left AC joint injury  Left shoulder contusion  Low suspicion for rotator cuff injury    Labs & Studies:  XRs left shoulder    Treatments:  Acetaminophen PO  Ketorolac IM  Lidocaine topical patch  Left shoulder sling    Discharge Medication List as of 1/24/2023  1:32 AM        START taking these medications    Details   meloxicam (MOBIC) 7.5 MG tablet Take 1 tablet by mouth daily for 10 days, Disp-10 tablet, R-0Normal      acetaminophen (TYLENOL) 500 MG tablet Take 2 tablets by mouth in the morning, at noon, and at bedtime for 7 days, Disp-42 tablet, R-0Normal      lidocaine 4 % external patch Place 1 patch onto the skin daily for 7 days, TransDERmal, DAILY Starting Tue 1/24/2023, Until Tue 1/31/2023, For 7 days, Disp-7 each, R-0, Normal      methocarbamol (ROBAXIN) 500 MG tablet Take 1 tablet by mouth nightly as needed (Pain or stiffness not relieved by your other medications), Disp-10 tablet, R-0Normal           Disposition:  Mr. Burke Andrade is well-appearing on my evaluation in the emergency department tonight. He reports that his pain has improved and is well controlled now. His exam findings are largely reassuring. X-ray imaging of his shoulder does not show overt, acute, bony pathology (reviewed images independently and agree with radiology interpretation). Given his mechanism of injury, exam findings, and the appearance of his shoulder on x-ray imaging I would be most suspicious for a minor left AC joint injury. Discussed exam findings, x-ray results, likely diagnosis, other potential etiologies of pain, and expected clinical course with him at length. He is comfortable with discharge home now. Recommended outpatient orthopaedics and physical therapy follow-up. Return precautions reviewed in detail. Clinical Impression(s)  1. Fall from standing, initial encounter    2.  Injury of left shoulder, initial encounter        Medical Decision Making  Problems Addressed:  Fall from standing, initial encounter: acute illness or injury  Injury of left shoulder, initial encounter: acute illness or injury    Amount and/or Complexity of Data Reviewed  Radiology: ordered and independent interpretation performed. Decision-making details documented in ED Course. Risk  OTC drugs. Prescription drug management. Provider Signature: MD Shirley Robles MD  01/26/23 8202

## 2023-02-03 DIAGNOSIS — S43.402A SPRAIN OF LEFT SHOULDER, UNSPECIFIED SHOULDER SPRAIN TYPE, INITIAL ENCOUNTER: ICD-10-CM

## 2023-02-07 ENCOUNTER — OFFICE VISIT (OUTPATIENT)
Dept: ORTHOPEDIC SURGERY | Age: 63
End: 2023-02-07

## 2023-02-07 ENCOUNTER — TELEPHONE (OUTPATIENT)
Dept: ORTHOPEDIC SURGERY | Age: 63
End: 2023-02-07

## 2023-02-07 VITALS — WEIGHT: 233 LBS | HEIGHT: 66 IN | BODY MASS INDEX: 37.45 KG/M2

## 2023-02-07 DIAGNOSIS — M75.122 COMPLETE TEAR OF LEFT ROTATOR CUFF, UNSPECIFIED WHETHER TRAUMATIC: Primary | ICD-10-CM

## 2023-02-07 DIAGNOSIS — S43.432A TEAR OF LEFT GLENOID LABRUM, INITIAL ENCOUNTER: ICD-10-CM

## 2023-02-07 DIAGNOSIS — M19.012 ARTHRITIS OF LEFT ACROMIOCLAVICULAR JOINT: ICD-10-CM

## 2023-02-07 NOTE — PROGRESS NOTES
[facility]  [id number]  February 7, 2023    Chief Complaint   Patient presents with    Follow-up     [facility], Left shoulder, MRI results           History: The patient is a 45-year-old gentleman who is here for evaluation of his left shoulder. This is a Worker's Compensation injury. The patient reportedly works at [employer]. He works as a  and drives semi truck's. He reportedly slipped on ice and landed onto his left outstretched upper extremity. He is right-hand dominant. He continues to have left shoulder pain and weakness. He is here to review his MRI results. The patient's  past medical history, medications, allergies,  family history, social history, and review of systems have been reviewed, and dated and are recorded in the chart. Pertinent items are noted in HPI. Review of systems reviewed from Pertinent History Form dated on 1/24 and available in the patient's chart under the Media tab. Ht 5' 6\" (1.676 m)   Wt 233 lb (105.7 kg)   BMI 37.61 kg/m²     Physical: The patient presents today in no acute distress. He is alert and oriented to person, place and time. He displays appropriate mood and affect. He is well dressed, nourished and  groomed. He has a normal affect. Examination of the neck reveals no evidence of tenderness. Spurling's sign is negative. Active range of motion of the left shoulder is: 75 degrees abduction, 75 degrees forward flexion, 25 degrees of external rotation and internal rotation to L5. Passive range of motion of the left shoulder is: 160 degrees abduction, 160 degrees forward flexion, 30 degrees of external rotation and internal rotation to L4. Active and passive range of motion of the opposite shoulder is full. Examination of the left shoulder reveals positive Neer and Rangel' impingement signs. There is mild subacromial crepitus with range of motion. Drop arm test is positive on the left. Speed's test is negative.  There is no evidence of tenderness over the Bicipital groove. He  does have tenderness over the [facility] joint. Cross body adduction test is positive. He has moderate tenderness over the subacromial space. There is no evidence of scapular winging. Lift off sign is negative. There is no evidence of atrophy. External rotation strength is 3/5 on the left. There are no skin lesions, cellulitis, or extreme edema in the upper extremities. Sensation is intact to axillary, median, radial, and ulnar nerves bilaterally. The patient has warm and well-perfused bilateral upper extremities with brisk capillary refill. Radial and ulnar pulses are palpable and 2+ bilaterally. X-rays: MRI of the left shoulder was extensively reviewed. The MRI confirms a full-thickness tear involving the supraspinatus and infraspinatus with moderate retraction. There is no evidence of fatty infiltration or atrophy. There is moderate to severe AC joint arthritis. There is evidence of a chronic long head of the biceps tendon tear with retraction. There is complex tearing of the labrum. Impression: 1) left shoulder rotator cuff tear  2) left shoulder AC joint arthritis #3 left shoulder superior labral tear    Plan: At this time we will schedule the patient for a left shoulder arthroscopy with labral debridement, open nick, subacromial decompression and rotator cuff tear. All risks including but not limited to blood loss, infection, persistent pain, increased pain, stiffness, weakness, neurovascular injury and the risks of anesthesia were discussed. The patient understands all risks and benefits of the procedure and agrees to proceed. The patient does report having an inguinal hernia which is in need of repair by Dr. Lazaro. We discussed the issue in detail. The patient could possibly proceed with left inguinal hernia repair in the very near future and then we could plan on left shoulder rotator cuff repair in approximately 6 to 7 weeks.     No orders of the defined types were placed in this encounter.

## 2023-02-07 NOTE — LETTER
[facility]  [place] [address] [address] [address]  Phone: [phone]  Fax: [phone]    Stefania Chapman MD        February 7, 2023     Patient: Raheem Garcia   YOB: 1960   Date of Visit: 2/7/2023       To Whom It May Concern: It is my medical opinion that Lindsay Sesay may return to light duty immediately with the following restrictions: no use of the left upper extremity. If you have any questions or concerns, please don't hesitate to call.     Sincerely,          Stefania Chapman MD

## 2023-02-07 NOTE — LETTER
[facility]  [address] [address]  Phone: [phone]  Fax: [phone]    Bunnie Bosworth, MD        February 7, 2023     Patient: Jayme Bettencourt   YOB: 1960   Date of Visit: 2/7/2023       To Whom It May Concern: It is my medical opinion that Ross Dupont would be fully recovered from his left shoulder surgery approximally 3 months post operatively. If you have any questions or concerns, please don't hesitate to call.     Sincerely,          Bunnie Bosworth, MD

## 2023-02-07 NOTE — TELEPHONE ENCOUNTER
Please submit a C9 for adding diagnosis to claim and surgery. Surgery letter was sent to work comp pool.     Diagnosis:   M75.122 left shoulder rotator cuff tear  M19.012 AC joint arthritis   S43.432A left shoulder labral tear

## 2023-02-07 NOTE — LETTER
[facility]: [phone]: [phone]  Surgery Scheduling Form:  DEMOGRAPHICS:                                                                                                              .    Patient Name:  Alin Oneil    Patient :  1960   Patient SS#:        Patient Phone:  [phone] (home)      Patient Address:  [address] [place] [address]    Insurance:   Payer/Plan Subscr  Sex Relation Sub. Ins. ID Effective Group Num   1. ACCIDENT FUND* BERTA BURRIS 1960 Male Self [id number]                                    [address] [address]   2. GENERIC SELF-* BERTA BURRIS 1960 Male Self  23                                    [address] [address]   DIAGNOSIS & PROCEDURE:                                                                                            .    Diagnosis:  Rotator cuff tear, acromioclavicular joint arthritis, labral tear-left shoulder  M75.122, M19.012, [id number]    Operation:  Arthroscopy labral debridement, open nick, subacromial decompression, and rotator cuff repair-left shoulder   [id number]     Location:  [facility]    Surgeon:  Pankaj Pang    SCHEDULING INFORMATION:                                                                                         .    Requested Date:  *** OR Time: ***  Patient Arrival Time: ***     OR Time Required:  90  Minutes     1 1/2 hours    Anesthesia:  General and pre-op scalene   SA Required:  Yes-    Equipment:  [employer]    Status:  outpatient PAT Required:  Yes  COVID19 test:  ***    Latex Allergy:  {yes/no/unk:174404} Defibrilator or Pacemaker:  {yes/no/unk:740947}    Isolation Precautions:  {yes/no/unk:096697}                       Jesus Loo MD     23   BILLING INFORMATION:                                                                                                    .                              CPT Code Modifier  Prior auth: pending                                                 Pre-Admission Testing Order Set   In accordance with our formulary system, a generic equivalent drug may be dispensed and administered unless a D.A. W. is written with the medication order  All orders without checkboxes will processed automatically unless crossed out. Orders with checkboxes MUST be checked in order to be carried out. Debbie Lugo                                                        1960  Date of Procedure/Surgery: ***  1. H & P for ALL procedure/surgery completed within 30 days, to be updated day of procedure/surgery  2. [ ]Consults: PT/OT evaluation  3. [X ]Defer to Anesthesia for Pre-Admission testing orders  4. Diagnostic Tests:  [ ]EKG (if not completed in last 3 months) IF: (check all that apply)   Other:  [ [place] Both (if not completed in last 6 months) IF: (check all that apply)   Hx Malignancy   Hx CVS/Thoracic Surg   CVS Disease   Hx Pneumonia last 3 months   Chronic Pulm Disease  Other:  [ ]Radiology   Knee Right Left Standing AP knee, hip to ankle on scoliosis film   Other:  5. Labs  [ ]Basic Metabolic Profile  IF: (check all that apply)  [ ]Albumin    Other:     __________________________________________________________________  [ ]CBC without diff   Pre op exam      __________________________________________________________________  [ ]PT/INR  PTT   Pre op exam         __________________________________________________________________  [ ]Type & Screen   Surg with potiential for signif.  blood loss  [ ]Type & cross match 2 units         __________________________________________________________________  [ ]Urine routine reflex to culture (UAR) If cultures positive, repeat on                  admission [ ]Clean catch urine  [ ]Nasal culture for MRSA   [ ]Nasal MRSA culture  __________________________________________________________________  6. [ ]Other:      TO: ___________________________________________ Date: ____________ Time: _________    Physician Signature:          2/7/2023 4:23 PM                   Pre-operative Physician Prophylaxis Orders      Sudha Chadwick  1960    All orders without checkboxes will be processed automatically unless crossed out. Orders with checkboxes MUST be checked in order to be carried out. 1. Allergies: Patient has no known allergies. 2. Procedure:  Arthroscopy labral debridement, open nick, subacromial decompression, and rotator cuff repair-left shoulder             Ht Readings from Last 1 Encounters:   02/07/23 5' 6\" (1.676 m)               Wt Readings from Last 1 Encounters:   02/07/23 233 lb (105.7 kg)     4. [ ] DVT Prophylaxis-Contraindication (Do not give)  Allergy to heparin Currently on anticoagulation  Not indicated, low risk patient  Thrombocytopenia Admitted for bleeding diagnosis Surgery or invasive procedure  [ ] Sequential Compression Boots to unaffected or bilateral extremities  [ ] Venous Compression Hose (JAZMÍN hose) to unaffected or bilateral extremities        Knee high  [ ] Heparin 5,000 units subcutaneously 1-2 hours pre op into the thigh opposite of operative site    5.   [ ] Beta Blocker  Patient to take beta blocker the morning of surgery with a sip of water as prescribed at home  Other:    6. Ginette.Ny ] Antimicrobial Prophylaxis (Consensus Guideline Recommendations) for       S.C.I. P. procedures  All antibiotics to be initiated 30 minutes pre-op (prior to leaving pre-op hold area/ACU) EXCEPT: Vancomycin and Ciprofloxacin. Initiate Vancomycin and Ciprofloxacin 2 hours pre-op. For combination antibiotic orders, start Ciprofloxacin first, then start second antibiotic ordered. In house patients, send pre-op antibiotics with patient to pre-op hold, do not initiate.     Surgical Procedure Routine pre-op Antibiotic  Penicillin or Cephalosporin Allergy  Orthopaedic  X Cefazolin (Ancef) 2 gm IVPB x1 X Clindamycin 900 mg IVPB x1    or     If > 80 kg Cefazolin 2 gm IVPB x1 Vancomycin 1 gm IVPB x1  Approved indications for Vancomycin:  MRSA related chronic wound dialysis  Other antibiotic to be given:    TO: _______________________________________________________ Date: ____________ Time: _______    Physician Signature:           Date: 2/7/2023  Time: 4:23 PM    Faxed to Pharmacy RN Signature: _________________________________ Date: _________ Time: _______

## 2023-03-01 NOTE — TELEPHONE ENCOUNTER
I spoke with the patient to inform him the surgery with his left shoulder was approved. He would like to discuss surgery dates with his employer/family and will call back to schedule.

## 2023-03-03 ENCOUNTER — TELEPHONE (OUTPATIENT)
Dept: ORTHOPEDIC SURGERY | Age: 63
End: 2023-03-03

## 2023-03-06 ENCOUNTER — TELEPHONE (OUTPATIENT)
Dept: ORTHOPEDIC SURGERY | Age: 63
End: 2023-03-06

## 2023-03-06 ENCOUNTER — TELEPHONE (OUTPATIENT)
Dept: FAMILY MEDICINE CLINIC | Age: 63
End: 2023-03-06

## 2023-03-06 NOTE — TELEPHONE ENCOUNTER
Urgent care called due to doing pre op on pt. Request for medical records- can send a release. Request for recent ekg, noted none on file at office. And recent blood work none since October. Urgent care noted that they will do there instead of request due to length of time.

## 2023-03-06 NOTE — TELEPHONE ENCOUNTER
Surgery Pre-op Physical    Contact: Patient  Surgical: Pre-Op Physical  Patient Contact Number: [phone]     Please contact patient  he has a question regarding Pre-op Physical.

## 2023-03-06 NOTE — TELEPHONE ENCOUNTER
Surgery Pre-Op Testing      Contact Name: PATIENT  Patient Contact Number: [phone]. Patient called stating he had his pre-op physical done and the doctor wants further testing but the patient is not sure what the testing is and is requesting a call from clinic.

## 2023-03-06 NOTE — TELEPHONE ENCOUNTER
I called patient, no answer, no voicemail set up. Please get more information as to what the patient has questions about regarding his pre op physical.    He can have his physical done at an urgent care vs PCP office since it is [facility].

## 2023-03-07 NOTE — TELEPHONE ENCOUNTER
I spoke with the patient. He states he went to the Urgent Care for his pre op H&P. He is now being sent to a Cardiologist at [facility] for further workup. His appointment is on 3/9/23 at 11 am. He will call the office to inform us of the results of that appointment.  The patient is aware that he needs to be cleared fully by Urgent Care, who is awaiting blood work results, and the Cardiologist.

## 2023-03-09 NOTE — PROGRESS NOTES
[facility] time ____1255________        Surgery time ______1455______    Take the following medications with a sip of water: Follow your MD/Surgeons pre-procedure instructions regarding your medications     Do not eat or drink anything after 12:00 midnight prior to your surgery. This includes water chewing gum, mints and ice chips. You may brush your teeth and gargle the morning of your surgery, but do not swallow the water     Please see your family doctor/pediatrician for a history and physical and/or concerning medications. Bring any test results/reports from your physicians office. If you are under the care of a heart doctor or specialist doctor, please be aware that you may be asked to them for clearance    You may be asked to stop blood thinners such as Coumadin, Plavix, Fragmin, Lovenox, etc., or any anti-inflammatories such as:  Aspirin, Ibuprofen, Advil, Naproxen prior to your surgery. We also ask that you stop any OTC medications such as fish oil, vitamin E, glucosamine, garlic, Multivitamins, COQ 10, etc.    We ask that you do not smoke 24 hours prior to surgery  We ask that you do not  drink any alcoholic beverages 24 hours prior to surgery     You must make arrangements for a responsible adult to take you home after your surgery. For your safety you will not be allowed to leave alone or drive yourself home. Your surgery will be cancelled if you do not have a ride home. Also for your safety, it is strongly suggested that someone stay with you the first 24 hours after your surgery. A parent or legal guardian must accompany a child scheduled for surgery and plan to stay at the hospital until the child is discharged. Please do not bring other children with you. For your comfort, please wear simple loose fitting clothing to the hospital.  Please do not bring valuables.     Do not wear any make-up or nail polish on your fingers or toes      For your safety, please do not wear any jewelry or body piercing's on the day of surgery. All jewelry must be removed. If you have dentures, they will be removed before going to operating room. For your convenience, we will provide you with a container. If you wear contact lenses or glasses, they will be removed, please bring a case for them. If you have a living will and a durable power of  for healthcare, please bring in a copy. As part of our patient safety program to minimize surgical site infections, we ask you to do the following:    Please notify your surgeon if you develop any illness between         now and the  day of your surgery. This includes a cough, cold, fever, sore throat, nausea,         or vomiting, and diarrhea, etc.   Please notify your surgeon if you experience dizziness, shortness         of breath or blurred vision between now and the time of your surgery. Do not shave your operative site 96 hours prior to surgery. For face and neck surgery, men may use an electric razor 48 hours   prior to surgery. You may shower the night before surgery or the morning of   your surgery with an antibacterial soap. You will need to bring a photo ID and insurance card    [facility] has an onsite pharmacy, would you like to utilize our pharmacy     If you will be staying overnight and use a C-pap machine, please bring   your C-pap to hospital     Our goal is to provide you with excellent care, therefore, visitors will be limited to two(2) in the room at a time so that we may focus on providing this care for you. Please contact pre-admission testing if you have any further questions. [facility] phone number:  [phone] PAT fax number:  [phone]  Please note these are generalized instructions for all surgical cases, you may be provided with more specific instructions according to your surgery.     C-Difficile admission screening and protocol:       * Admitted with diarrhea? [] YES    [x]  NO     *Prior history of C-Diff. In last 3 months? [] YES    [x]  NO     *Antibiotic use in the past 6-8 weeks? [x]  NO    []  YES                 If yes, which ANTIBIOTIC AND REASON______     *Prior hospitalization or nursing home in the last month? []  YES    [x]  NO        SAFETY FIRST. .call before you fall

## 2023-03-13 ENCOUNTER — TELEPHONE (OUTPATIENT)
Dept: ORTHOPEDIC SURGERY | Age: 63
End: 2023-03-13

## 2023-03-13 NOTE — TELEPHONE ENCOUNTER
General Question     Subject: SX TIMING  Patient and /or Facility Request: Viviane West  Contact Number: [phone]     S/w patient he stated he was told the sx would be approx. 10 minutes, but was then told it would be 2 hrs. He is wanting clarification. Please advise.

## 2023-03-13 NOTE — TELEPHONE ENCOUNTER
Called and answered all his questions re his surgery and his time for the surgery   He was ok with this

## 2023-03-14 ENCOUNTER — ANESTHESIA EVENT (OUTPATIENT)
Dept: OPERATING ROOM | Age: 63
End: 2023-03-14
Payer: COMMERCIAL

## 2023-03-15 ENCOUNTER — HOSPITAL ENCOUNTER (OUTPATIENT)
Age: 63
Setting detail: OUTPATIENT SURGERY
Discharge: HOME | End: 2023-03-15
Attending: ORTHOPAEDIC SURGERY | Admitting: ORTHOPAEDIC SURGERY
Payer: COMMERCIAL

## 2023-03-15 ENCOUNTER — ANESTHESIA (OUTPATIENT)
Dept: OPERATING ROOM | Age: 63
End: 2023-03-15
Payer: COMMERCIAL

## 2023-03-15 VITALS
TEMPERATURE: 97.6 F | RESPIRATION RATE: 16 BRPM | DIASTOLIC BLOOD PRESSURE: 103 MMHG | OXYGEN SATURATION: 90 % | WEIGHT: 230 LBS | BODY MASS INDEX: 36.96 KG/M2 | SYSTOLIC BLOOD PRESSURE: 136 MMHG | HEART RATE: 105 BPM | HEIGHT: 66 IN

## 2023-03-15 DIAGNOSIS — S46.012A TRAUMATIC COMPLETE TEAR OF LEFT ROTATOR CUFF, INITIAL ENCOUNTER: Primary | ICD-10-CM

## 2023-03-15 PROCEDURE — 6360000002 HC RX W HCPCS: Performed by: ORTHOPAEDIC SURGERY

## 2023-03-15 PROCEDURE — 6370000000 HC RX 637 (ALT 250 FOR IP)

## 2023-03-15 PROCEDURE — 6360000002 HC RX W HCPCS: Performed by: ANESTHESIOLOGY

## 2023-03-15 PROCEDURE — 2580000003 HC RX 258

## 2023-03-15 PROCEDURE — 2580000003 HC RX 258: Performed by: ANESTHESIOLOGY

## 2023-03-15 PROCEDURE — 3700000000 HC ANESTHESIA ATTENDED CARE: Performed by: ORTHOPAEDIC SURGERY

## 2023-03-15 PROCEDURE — 2500000003 HC RX 250 WO HCPCS: Performed by: ORTHOPAEDIC SURGERY

## 2023-03-15 PROCEDURE — 7100000010 HC PHASE II RECOVERY - FIRST 15 MIN: Performed by: ORTHOPAEDIC SURGERY

## 2023-03-15 PROCEDURE — 2500000003 HC RX 250 WO HCPCS

## 2023-03-15 PROCEDURE — 7100000011 HC PHASE II RECOVERY - ADDTL 15 MIN: Performed by: ORTHOPAEDIC SURGERY

## 2023-03-15 PROCEDURE — 7100000000 HC PACU RECOVERY - FIRST 15 MIN: Performed by: ORTHOPAEDIC SURGERY

## 2023-03-15 PROCEDURE — 6360000002 HC RX W HCPCS

## 2023-03-15 PROCEDURE — 2720000010 HC SURG SUPPLY STERILE: Performed by: ORTHOPAEDIC SURGERY

## 2023-03-15 PROCEDURE — 2580000003 HC RX 258: Performed by: ORTHOPAEDIC SURGERY

## 2023-03-15 PROCEDURE — L3650 SO 8 ABD RESTRAINT PRE OTS: HCPCS | Performed by: ORTHOPAEDIC SURGERY

## 2023-03-15 PROCEDURE — 6370000000 HC RX 637 (ALT 250 FOR IP): Performed by: ANESTHESIOLOGY

## 2023-03-15 PROCEDURE — C1713 ANCHOR/SCREW BN/BN,TIS/BN: HCPCS | Performed by: ORTHOPAEDIC SURGERY

## 2023-03-15 PROCEDURE — 7100000001 HC PACU RECOVERY - ADDTL 15 MIN: Performed by: ORTHOPAEDIC SURGERY

## 2023-03-15 PROCEDURE — 3700000001 HC ADD 15 MINUTES (ANESTHESIA): Performed by: ORTHOPAEDIC SURGERY

## 2023-03-15 PROCEDURE — 64415 NJX AA&/STRD BRCH PLXS IMG: CPT | Performed by: ANESTHESIOLOGY

## 2023-03-15 PROCEDURE — 3600000004 HC SURGERY LEVEL 4 BASE: Performed by: ORTHOPAEDIC SURGERY

## 2023-03-15 PROCEDURE — A4217 STERILE WATER/SALINE, 500 ML: HCPCS | Performed by: ORTHOPAEDIC SURGERY

## 2023-03-15 PROCEDURE — 3600000014 HC SURGERY LEVEL 4 ADDTL 15MIN: Performed by: ORTHOPAEDIC SURGERY

## 2023-03-15 PROCEDURE — 2709999900 HC NON-CHARGEABLE SUPPLY: Performed by: ORTHOPAEDIC SURGERY

## 2023-03-15 DEVICE — ANCHOR SUT DIA2.9MM NO2 MAXBRAID DBL LD SFT W/ CUT NDL: Type: IMPLANTABLE DEVICE | Site: SHOULDER | Status: FUNCTIONAL

## 2023-03-15 DEVICE — ANCHOR SUT DIA4.5MM PEEK OPTMA LINK KNOTLESS CLEAT EYELET: Type: IMPLANTABLE DEVICE | Site: SHOULDER | Status: FUNCTIONAL

## 2023-03-15 RX ORDER — PROPOFOL 10 MG/ML
INJECTION, EMULSION INTRAVENOUS PRN
Status: DISCONTINUED | OUTPATIENT
Start: 2023-03-15 | End: 2023-03-15 | Stop reason: SDUPTHER

## 2023-03-15 RX ORDER — DEXAMETHASONE SODIUM PHOSPHATE 4 MG/ML
INJECTION, SOLUTION INTRA-ARTICULAR; INTRALESIONAL; INTRAMUSCULAR; INTRAVENOUS; SOFT TISSUE PRN
Status: DISCONTINUED | OUTPATIENT
Start: 2023-03-15 | End: 2023-03-15 | Stop reason: SDUPTHER

## 2023-03-15 RX ORDER — MAGNESIUM HYDROXIDE 1200 MG/15ML
LIQUID ORAL CONTINUOUS PRN
Status: COMPLETED | OUTPATIENT
Start: 2023-03-15 | End: 2023-03-15

## 2023-03-15 RX ORDER — SODIUM CHLORIDE 9 MG/ML
INJECTION, SOLUTION INTRAVENOUS PRN
Status: DISCONTINUED | OUTPATIENT
Start: 2023-03-15 | End: 2023-03-15 | Stop reason: HOSPADM

## 2023-03-15 RX ORDER — OXYCODONE HYDROCHLORIDE AND ACETAMINOPHEN 5; 325 MG/1; MG/1
1-2 TABLET ORAL EVERY 6 HOURS PRN
Qty: 40 TABLET | Refills: 0 | Status: SHIPPED | OUTPATIENT
Start: 2023-03-15 | End: 2023-03-22

## 2023-03-15 RX ORDER — ONDANSETRON 2 MG/ML
INJECTION INTRAMUSCULAR; INTRAVENOUS PRN
Status: DISCONTINUED | OUTPATIENT
Start: 2023-03-15 | End: 2023-03-15 | Stop reason: SDUPTHER

## 2023-03-15 RX ORDER — SUCCINYLCHOLINE/SOD CL,ISO/PF 200MG/10ML
SYRINGE (ML) INTRAVENOUS PRN
Status: DISCONTINUED | OUTPATIENT
Start: 2023-03-15 | End: 2023-03-15 | Stop reason: SDUPTHER

## 2023-03-15 RX ORDER — FENTANYL CITRATE 50 UG/ML
INJECTION, SOLUTION INTRAMUSCULAR; INTRAVENOUS PRN
Status: DISCONTINUED | OUTPATIENT
Start: 2023-03-15 | End: 2023-03-15 | Stop reason: SDUPTHER

## 2023-03-15 RX ORDER — DROPERIDOL 2.5 MG/ML
0.62 INJECTION, SOLUTION INTRAMUSCULAR; INTRAVENOUS
Status: DISCONTINUED | OUTPATIENT
Start: 2023-03-15 | End: 2023-03-15 | Stop reason: HOSPADM

## 2023-03-15 RX ORDER — OXYCODONE HYDROCHLORIDE 5 MG/1
5 TABLET ORAL
Status: COMPLETED | OUTPATIENT
Start: 2023-03-15 | End: 2023-03-15

## 2023-03-15 RX ORDER — ONDANSETRON 2 MG/ML
4 INJECTION INTRAMUSCULAR; INTRAVENOUS
Status: DISCONTINUED | OUTPATIENT
Start: 2023-03-15 | End: 2023-03-15 | Stop reason: HOSPADM

## 2023-03-15 RX ORDER — LIDOCAINE HYDROCHLORIDE 20 MG/ML
INJECTION, SOLUTION EPIDURAL; INFILTRATION; INTRACAUDAL; PERINEURAL PRN
Status: DISCONTINUED | OUTPATIENT
Start: 2023-03-15 | End: 2023-03-15 | Stop reason: SDUPTHER

## 2023-03-15 RX ORDER — BUPIVACAINE HYDROCHLORIDE AND EPINEPHRINE 5; 5 MG/ML; UG/ML
INJECTION, SOLUTION EPIDURAL; INTRACAUDAL; PERINEURAL
Status: COMPLETED | OUTPATIENT
Start: 2023-03-15 | End: 2023-03-15

## 2023-03-15 RX ORDER — SODIUM CHLORIDE 0.9 % (FLUSH) 0.9 %
5-40 SYRINGE (ML) INJECTION EVERY 12 HOURS SCHEDULED
Status: DISCONTINUED | OUTPATIENT
Start: 2023-03-15 | End: 2023-03-15 | Stop reason: HOSPADM

## 2023-03-15 RX ORDER — MIDAZOLAM HYDROCHLORIDE 1 MG/ML
INJECTION INTRAMUSCULAR; INTRAVENOUS PRN
Status: DISCONTINUED | OUTPATIENT
Start: 2023-03-15 | End: 2023-03-15 | Stop reason: SDUPTHER

## 2023-03-15 RX ORDER — EPHEDRINE SULFATE/0.9% NACL/PF 50 MG/5 ML
SYRINGE (ML) INTRAVENOUS PRN
Status: DISCONTINUED | OUTPATIENT
Start: 2023-03-15 | End: 2023-03-15 | Stop reason: SDUPTHER

## 2023-03-15 RX ORDER — LIDOCAINE HYDROCHLORIDE 40 MG/ML
SOLUTION TOPICAL PRN
Status: DISCONTINUED | OUTPATIENT
Start: 2023-03-15 | End: 2023-03-15 | Stop reason: SDUPTHER

## 2023-03-15 RX ORDER — PHENYLEPHRINE HCL IN 0.9% NACL 1 MG/10 ML
SYRINGE (ML) INTRAVENOUS PRN
Status: DISCONTINUED | OUTPATIENT
Start: 2023-03-15 | End: 2023-03-15 | Stop reason: SDUPTHER

## 2023-03-15 RX ORDER — ROCURONIUM BROMIDE 10 MG/ML
INJECTION, SOLUTION INTRAVENOUS PRN
Status: DISCONTINUED | OUTPATIENT
Start: 2023-03-15 | End: 2023-03-15 | Stop reason: SDUPTHER

## 2023-03-15 RX ORDER — SODIUM CHLORIDE 0.9 % (FLUSH) 0.9 %
5-40 SYRINGE (ML) INJECTION PRN
Status: DISCONTINUED | OUTPATIENT
Start: 2023-03-15 | End: 2023-03-15 | Stop reason: HOSPADM

## 2023-03-15 RX ORDER — FENTANYL CITRATE 50 UG/ML
25 INJECTION, SOLUTION INTRAMUSCULAR; INTRAVENOUS EVERY 5 MIN PRN
Status: DISCONTINUED | OUTPATIENT
Start: 2023-03-15 | End: 2023-03-15 | Stop reason: HOSPADM

## 2023-03-15 RX ADMIN — Medication 5 MG: at 15:48

## 2023-03-15 RX ADMIN — Medication 5 MG: at 15:38

## 2023-03-15 RX ADMIN — PHENYLEPHRINE HYDROCHLORIDE 100 MCG: 10 INJECTION INTRAVENOUS at 15:16

## 2023-03-15 RX ADMIN — DEXAMETHASONE SODIUM PHOSPHATE 8 MG: 4 INJECTION, SOLUTION INTRAMUSCULAR; INTRAVENOUS at 15:09

## 2023-03-15 RX ADMIN — SUGAMMADEX 300 MG: 100 INJECTION, SOLUTION INTRAVENOUS at 16:35

## 2023-03-15 RX ADMIN — Medication 100 MCG: at 15:14

## 2023-03-15 RX ADMIN — Medication 140 MG: at 14:57

## 2023-03-15 RX ADMIN — CEFAZOLIN 2000 MG: 2 INJECTION, POWDER, FOR SOLUTION INTRAMUSCULAR; INTRAVENOUS at 15:03

## 2023-03-15 RX ADMIN — PHENYLEPHRINE HYDROCHLORIDE 150 MCG: 10 INJECTION INTRAVENOUS at 15:14

## 2023-03-15 RX ADMIN — ROPIVACAINE HYDROCHLORIDE 15 ML: 5 INJECTION, SOLUTION EPIDURAL; INFILTRATION; PERINEURAL at 14:28

## 2023-03-15 RX ADMIN — Medication 10 MG: at 16:30

## 2023-03-15 RX ADMIN — LIDOCAINE HYDROCHLORIDE 100 MG: 20 INJECTION, SOLUTION EPIDURAL; INFILTRATION; INTRACAUDAL; PERINEURAL at 14:55

## 2023-03-15 RX ADMIN — MIDAZOLAM 2 MG: 1 INJECTION INTRAMUSCULAR; INTRAVENOUS at 14:28

## 2023-03-15 RX ADMIN — Medication 10 MG: at 15:15

## 2023-03-15 RX ADMIN — SODIUM CHLORIDE: 9 INJECTION, SOLUTION INTRAVENOUS at 13:25

## 2023-03-15 RX ADMIN — Medication 5 MG: at 16:07

## 2023-03-15 RX ADMIN — ROCURONIUM BROMIDE 40 MG: 10 INJECTION INTRAVENOUS at 15:14

## 2023-03-15 RX ADMIN — ROCURONIUM BROMIDE 10 MG: 10 INJECTION INTRAVENOUS at 14:56

## 2023-03-15 RX ADMIN — PHENYLEPHRINE HYDROCHLORIDE 25 MCG/MIN: 10 INJECTION INTRAVENOUS at 15:08

## 2023-03-15 RX ADMIN — FENTANYL CITRATE 25 MCG: 50 INJECTION INTRAMUSCULAR; INTRAVENOUS at 16:09

## 2023-03-15 RX ADMIN — PROPOFOL 200 MG: 10 INJECTION, EMULSION INTRAVENOUS at 14:56

## 2023-03-15 RX ADMIN — ONDANSETRON 4 MG: 2 INJECTION INTRAMUSCULAR; INTRAVENOUS at 16:12

## 2023-03-15 RX ADMIN — FENTANYL CITRATE 25 MCG: 50 INJECTION INTRAMUSCULAR; INTRAVENOUS at 16:04

## 2023-03-15 RX ADMIN — OXYCODONE HYDROCHLORIDE 5 MG: 5 TABLET ORAL at 17:37

## 2023-03-15 RX ADMIN — FENTANYL CITRATE 50 MCG: 50 INJECTION INTRAMUSCULAR; INTRAVENOUS at 14:53

## 2023-03-15 RX ADMIN — LIDOCAINE HYDROCHLORIDE 4 ML: 40 SOLUTION TOPICAL at 14:58

## 2023-03-15 ASSESSMENT — PAIN SCALES - GENERAL
PAINLEVEL_OUTOF10: 0
PAINLEVEL_OUTOF10: 2
PAINLEVEL_OUTOF10: 2
PAINLEVEL_OUTOF10: 3
PAINLEVEL_OUTOF10: 0
PAINLEVEL_OUTOF10: 0

## 2023-03-15 ASSESSMENT — PAIN DESCRIPTION - DESCRIPTORS: DESCRIPTORS: SORE;TENDER

## 2023-03-15 ASSESSMENT — PAIN DESCRIPTION - LOCATION: LOCATION: SHOULDER

## 2023-03-15 ASSESSMENT — PAIN DESCRIPTION - PAIN TYPE: TYPE: SURGICAL PAIN

## 2023-03-15 ASSESSMENT — PAIN DESCRIPTION - ONSET: ONSET: GRADUAL

## 2023-03-15 ASSESSMENT — PAIN DESCRIPTION - ORIENTATION: ORIENTATION: LEFT

## 2023-03-15 ASSESSMENT — PAIN - FUNCTIONAL ASSESSMENT: PAIN_FUNCTIONAL_ASSESSMENT: 0-10

## 2023-03-15 ASSESSMENT — PAIN DESCRIPTION - FREQUENCY: FREQUENCY: INTERMITTENT

## 2023-03-15 NOTE — ANESTHESIA PRE PROCEDURE
Department of Anesthesiology  Preprocedure Note       Name:  Herber Jimenes   Age:  58 y.o.  :  1960                                          MRN:  [id number]         Date:  3/15/2023      Surgeon: Francisco Turner):  Hernandez Galan MD    Procedure: Procedure(s):  LEFT SHOULDER ARTHROSCOPY LABRAL DEBRIDEMENT, OPEN OSITO, SUBACROMIAL DECOMPRESSION AND ROTATOR CUFF REPAIR LEFT SHOULDER    Medications prior to admission:   Prior to Admission medications    Medication Sig Start Date End Date Taking? Authorizing Provider   lisinopril-hydroCHLOROthiazide (PRINZIDE;ZESTORETIC) 20-12.5 MG per tablet TAKE ONE TABLET BY MOUTH DAILY 23   Nayeli Daniels MD   aspirin 81 MG tablet Take 81 mg by mouth daily. Historical Provider, MD       Current medications:    Current Facility-Administered Medications   Medication Dose Route Frequency Provider Last Rate Last Admin    ceFAZolin (ANCEF) 2,000 mg in sodium chloride 0.9 % 50 mL IVPB (mini-bag)  2,000 mg IntraVENous Once Hernandez Galan MD        sodium chloride flush 0.9 % injection 5-40 mL  5-40 mL IntraVENous 2 times per day Hermila Vee MD        sodium chloride flush 0.9 % injection 5-40 mL  5-40 mL IntraVENous PRN Hermila Vee MD        0.9 % sodium chloride infusion   IntraVENous PRN Hermila Vee  mL/hr at 03/15/23 1325 New Bag at 03/15/23 1325       Allergies:  No Known Allergies    Problem List:    Patient Active Problem List   Diagnosis Code    Hypertension I10    Obesity (BMI 30-39. 9) E66.9    Dyslipidemia E78.5    FH: CAD (coronary artery disease) Z82.49    Low HDL (under 40) E78.6    Prediabetes R73.03    Left inguinal hernia K40.90       Past Medical History:        Diagnosis Date    Class 2 obesity     Hypertension        Past Surgical History:        Procedure Laterality Date    EYE SURGERY Left     detached retina       Social History:    Social History     Tobacco Use    Smoking status: Never    Smokeless tobacco: Never   Substance Use Topics    Alcohol use: No                                Counseling given: Not Answered      Vital Signs (Current):   Vitals:    03/09/23 1033 03/15/23 1319   BP:  (!) 158/80   Pulse:  71   Resp:  18   Temp:  98.8 °F (37.1 °C)   TempSrc:  Temporal   SpO2:  96%   Weight: 230 lb (104.3 kg)    Height: 5' 6\" (1.676 m)                                               BP Readings from Last 3 Encounters:   03/15/23 (!) 158/80   01/24/23 135/86   01/23/23 124/78       NPO Status: Time of last liquid consumption: 2300                        Time of last solid consumption: 2300                        Date of last liquid consumption: 03/14/23                        Date of last solid food consumption: 03/14/23    BMI:   Wt Readings from Last 3 Encounters:   03/09/23 230 lb (104.3 kg)   02/07/23 233 lb (105.7 kg)   01/25/23 233 lb (105.7 kg)     Body mass index is 37.12 kg/m². CBC:   Lab Results   Component Value Date/Time    WBC 7.2 03/30/2019 03:40 AM    RBC 4.67 03/30/2019 03:40 AM    HGB 15.0 03/30/2019 03:40 AM    HCT 44.6 03/30/2019 03:40 AM    MCV 95.5 03/30/2019 03:40 AM    RDW 12.2 03/30/2019 03:40 AM     03/30/2019 03:40 AM       CMP:   Lab Results   Component Value Date/Time     10/03/2022 10:23 AM    K 4.4 10/03/2022 10:23 AM     10/03/2022 10:23 AM    CO2 26 10/03/2022 10:23 AM    BUN 23 10/03/2022 10:23 AM    CREATININE 0.9 10/03/2022 10:23 AM    GFRAA >60 10/03/2022 10:23 AM    GFRAA >60 10/15/2012 02:39 PM    LABGLOM >60 10/03/2022 10:23 AM    GLUCOSE 160 10/03/2022 10:23 AM    CALCIUM 9.5 10/03/2022 10:23 AM    AST 21 12/12/2015 11:17 AM    ALT 25 12/12/2015 11:17 AM       POC Tests: No results for input(s): POCGLU, POCNA, POCK, POCCL, POCBUN, POCHEMO, POCHCT in the last 72 hours.     Coags: No results found for: PROTIME, INR, APTT    HCG (If Applicable): No results found for: PREGTESTUR, PREGSERUM, HCG, HCGQUANT     ABGs: No results found for: PHART, PO2ART, BMT1TBF, RJL9RCF, BEART, W4EDUSCF     Type & Screen (If Applicable):  No results found for: LABABO, LABRH    Drug/Infectious Status (If Applicable):  No results found for: HIV, HEPCAB    COVID-19 Screening (If Applicable): No results found for: COVID19        Anesthesia Evaluation  Patient summary reviewed no history of anesthetic complications:   Airway: Mallampati: II  TM distance: >3 FB   Neck ROM: full  Mouth opening: > = 3 FB   Dental: normal exam         Pulmonary:Negative Pulmonary ROS breath sounds clear to auscultation                             Cardiovascular:  Exercise tolerance: good (>4 METS),   (+) hypertension: moderate, dysrhythmias (RBBB):,     (-) past MI, CAD and  CORBETT    ECG reviewed  Rhythm: regular  Rate: normal                    Neuro/Psych:   Negative Neuro/Psych ROS              GI/Hepatic/Renal:        (-) liver disease and no renal disease       Endo/Other: Negative Endo/Other ROS                    Abdominal:             Vascular: negative vascular ROS. Other Findings:           Anesthesia Plan      general and regional     ASA 3     (65 yo M with PMHx of HTN, HLD, RBBB, obesity presents for left shoulder arthroscopy and rotator cuff repair. Discussed left interscalene nerve block. I discussed with the patient the risks and benefits to general anesthesia including possible anesthetic complications but not limited to: PONV, damage to the airway and surrounding structures (teeth, lips, gums, tongue, etc.), adverse reactions to medications, cardiac complications (MI, CHF, arrhythmias, etc.), respiratory complications (post-op ventilation, respiratory failure, etc.), neurologic complications (nerve damage, stroke, seizure) and death. The patient was given the opportunity to ask questions and all questions were answered to the patient's satisfaction.       Discussed with the patient the risks of nerve block including nerve injury, paresthesia, bleeding, infection, nausea, vomiting, local anesthetic systemic toxicity, and headache. The patient verbalized understanding and wished to proceed.  )  Induction: intravenous. MIPS: Postoperative opioids intended and Prophylactic antiemetics administered. Anesthetic plan and risks discussed with patient. Plan discussed with CRNA. This pre-anesthesia assessment may be used as a history and physical.    DOS STAFF ADDENDUM:    Pt seen and examined, chart reviewed (including anesthesia, drug and allergy history). No interval changes to history and physical examination. Anesthetic plan, risks, benefits, alternatives, and personnel involved discussed with patient. Patient verbalized an understanding and agrees to proceed.       Emma Baum MD  March 15, 2023  1:58 PM

## 2023-03-15 NOTE — PROGRESS NOTES
Patient to PACU from OR. Pt asleep. VS stable (see flowsheet) on 5L NC. Surgical dressings clean, dry and intact. Sling in place.

## 2023-03-15 NOTE — DISCHARGE INSTRUCTIONS
Outpatient Post-Operative Discharge Instructions for Open Rotator Cuff Shoulder Repair  Call your surgeons office today to schedule your follow-up appointment if not scheduled already:   [phone]. See Dr. Ross Riggins in 8-13 days. Resume your normal activities as you begin to feel better, unless otherwise instructed by your physician. Walking restrictions: None     Driving restrictions: Other:  No driving until follow up unless otherwise stated. Do not drive while taking pain medication or until 24 hours after anesthesia. Diet instructions: Start with clear liquids, progress to a light diet, then to a normal diet as tolerated. Take only clear liquids if nauseated or vomiting. Avoid alcoholic beverages and major decision making for 24 hours after anesthesia. Notify your physician if you have:  Excessive bleeding, drainage, redness, or other problems at the surgical site. Persistent nausea, vomiting, or diarrhea  Severe pain after taking prescribed pain medication  Hives, rash, or itching. Numbness or tingling, increased pain, or bluish, white, cool extremities around a cast, ace bandage or dressing. Temperature over 100 degrees F. If you cannot reach your physician for any concern, please go with this paper to an emergency facility nearest you. Medication instructions:  o None  o Prescription given  o Medication reconciliation sheet given to patient  Special Instructions:  Rest today                    Ice packs to shoulder  Frequency: every 2 hours for 72 hours  Keep bandage clean  Leave bandage on and intact until: follow up visit  It is ok to shower if Tegaderm dressing over shoulder wound. Move hand, wrist and elbow actively every hour while awake. Move shoulder gently in 5-7 days. Clean arm pit daily. I have received and reviewed these instructions with the nurse and I understand them. If I have any problems or questions at a later time, I will call my physician.    Patient Signature__________________________________Relationship:___________________    Nurse Review orders:___________________________________ Date: _____________

## 2023-03-15 NOTE — OP NOTE
Operative Note      Patient: Fariba Sanchez  YOB: 1960  MRN: [id number]    Date of Procedure: 3/15/2023    Pre-Op Diagnosis: ROTATOR CUFF TEAR, ACROMIOCLAVICULAR JOINT ARTHRITIS, LABRAL TEAR LEFT SHOULDER    Post-Op Diagnosis: #1 complex superior labral tear #2 large full-thickness rotator cuff tear #3 AC joint arthritis       Procedure(s):  LEFT SHOULDER ARTHROSCOPY LABRAL DEBRIDEMENT, OPEN MARYAM, SUBACROMIAL DECOMPRESSION AND ROTATOR CUFF REPAIR LEFT SHOULDER    Surgeon(s):  Denis Patel MD    Assistant:   Surgical Assistant: Rhianna Ramirez    Anesthesia: General    Estimated Blood Loss (mL): less than 50     Complications: None    Specimens:   * No specimens in log *    Implants:  * No implants in log *      Drains: * No LDAs found *    Findings: Large full-thickness rotator cuff tear noted    Detailed Description of Procedure:    PATIENT NAME:         Fariba Sanchez  YOB: 1960   MEDICAL RECORD#         [id number]  SURGERY DATE:         3/15/2023  SURGEON:                 Denis Patel MD        PREOPERATIVE DIAGNOSIS:   1. Left shoulder rotator cuff tear. 2. Left shoulder superior labral tear. 3. Left shoulder acromioclavicular joint arthritis. POSTOPERATIVE DIAGNOSIS:   1. Left shoulder large full thickness rotator cuff tear. 2. Left shoulder complex superior labral tear. 3. Left shoulder acromioclavicular joint arthritis. PROCEDURE:   1. Left shoulder diagnostic arthroscopy. 2. Left shoulder arthroscopy with superior labral debridement. 3. Left shoulder open rotator cuff repair. 4. Left shoulder open Maryam. 5. Left shoulder open subacromial decompression. ANESTHESIA: Interscalene block and general endotracheal anesthesia. IV FLUIDS: Crystalloid. ESTIMATED BLOOD LOSS: 100 mL. COMPLICATIONS: None. The patient tolerated the procedure quite well.      INDICATIONS: The patient is a 58 y.o. male with a longstanding history   of left shoulder pain. History of injury: fall. The patient had severe persistent pain. The   patient ultimately had an MRI scan which revealed findings consistent with a full thickness rotator cuff tear. The MRI confirmed AC joint arthritis and complex labral fraying. Despite conservative treatment, the patient had severe persistent pain. Due to his symptoms and findings the patient was ultimately cleared and scheduled for surgery. OPERATIVE REPORT:   The patient was identified preoperatively. Interscalene block was administered by Anesthesia. The patient was then   taken to the operating room and general endotracheal anesthesia was   administered by Anesthesia. Appropriate preoperative antibiotics were administered per SCIP measures. The patient was positioned in the beach chair position accordingly. The left upper extremity and shoulder was then chloraprepped in standard fashion. We then draped out in the standard fashion. We then marked out all bony prominences. We then began diagnostic arthroscopy utilizing a standard posterior portal. We noted a negative drive-through sign. We then noted complex tearing to the superior labrum extending anteriorly. We then started an anterior working portal and the biceps anchor, itself, was stable. There was no real evidence of chondromalacia of the glenoid. There was no evidence of chondromalacia involving the humeral head. We then used our arthroscopic shaver, and gently debrided the labrum. We then used our TurboVac ArthroCare 90, and   contoured the labrum to a stable rim. We then looked out laterally and there   was evidence of a full thickness rotator cuff tear. We then were ready for   conversion to an open procedure. We made an oblique incision centered over the [facility] joint. We infiltrated the incision site with 0.25% Marcaine with epinephrine. We incised skin and coagulated all bleeders with Bovie electrocautery.  We dissected down and   elevated our skin flaps both anteriorly and posteriorly. We then split the   fascia over the clavicle and over the [facility] joint. We elevated the fascia both   anteriorly and posteriorly. We carried our fascial split down onto the   acromion and took down the anterior deltoid and the CA ligament. We then   used our MicroChoice saw and resected the distal 8 mm of the clavicle. We   then used our MicroChoice saw and resected the anterior inferior portion of   the acromion. We used our rasp and created a type 1 acromion. We did resect   some of the hypertrophic bursal tissue. We then evaluated the rotator cuff,   and there was evidence of a full thickness rotator cuff tear with mild   retraction. This was a large tear involving the entire supraspinatus and a portion of the infraspinatus. We mobilized the tear, and passed a retention stitch into the edge. We then debrided the footprint. This worked out extremely well. We then were ready for passage of our anchors. We used 2 of the [employer] juggerknot anchors. We then passed all sutures through the torn margin of the rotator cuff. We then tied our sutures, and we were satisfied with an extremely secure repair. We then went ahead and incorporated the sutures from the juggerknot suture anchor into a lateral row repair. We first used our trocar to create our  hole for the Quattro link suture anchor. We then pulled all of the sutures through the Quattro link suture anchor and then seated the Quattro link suture anchor without difficulty. This gave us an extremely secure double row repair. This worked out extremely well. We irrigated all layers rather copiously. We then were ready for closure. We closed the fascia over the [facility] joint with interrupted figure-of-8 #1 Vicryl   stitches. We then went ahead and reattached the deltoid directly back to   bone with interrupted vertical mattress #2 Ethibond stitches.  We reinforced   the deltoid split with interrupted figure-of-8 #1 Vicryl stiches. We closed   the subcutaneous tissues with interrupted 2-0 simple Vicryl stitches. We   then closed the skin with a running 4-0 subcuticular Monocryl stitch. Sterile dressings were applied. There were no complications. The patient was put in a shoulder immobilizer.        Electronically signed by Mirlande Pulido MD on 3/15/2023 at 2:12 PM

## 2023-03-15 NOTE — PROGRESS NOTES
Pt in pacu for phase 2 care; all belongings with pt and wife see flow sheet and mar reviewed instructions with wife

## 2023-03-15 NOTE — H&P
The patient was interviewed and examined and there have been no changes since the documented History and Physical.  I have presented reasonable alternatives to the patient's proposed care, treatment and services. The discussion I have done encompassed risks, benefits and side effects related to the alternatives and the risks related to not receiving the proposed care, treatment and services.   Electronically signed by Mirlande Pulido MD on 3/15/2023 at 2:13 PM

## 2023-03-16 RX ORDER — ROPIVACAINE HYDROCHLORIDE 5 MG/ML
INJECTION, SOLUTION EPIDURAL; INFILTRATION; PERINEURAL
Status: DISCONTINUED | OUTPATIENT
Start: 2023-03-15 | End: 2023-03-16 | Stop reason: SDUPTHER

## 2023-03-16 NOTE — ANESTHESIA PROCEDURE NOTES
Peripheral Block    Patient location during procedure: pre-op  Reason for block: post-op pain management and at surgeon's request  Start time: 3/15/2023 2:28 PM  End time: 3/15/2023 2:35 PM  Staffing  Performed: anesthesiologist   Anesthesiologist: Kenton Mcclellan MD  Preanesthetic Checklist  Completed: patient identified, IV checked, site marked, risks and benefits discussed, surgical/procedural consents, equipment checked, pre-op evaluation, timeout performed, anesthesia consent given, oxygen available, monitors applied/VS acknowledged, fire risk safety assessment completed and verbalized and blood product R/B/A discussed and consented  Peripheral Block   Patient position: sitting  Prep: ChloraPrep  Provider prep: sterile gloves and mask  Patient monitoring: continuous pulse ox, frequent blood pressure checks, IV access and oxygen  Block type: Brachial plexus  Interscalene  Laterality: left  Injection technique: single-shot  Guidance: ultrasound guided  Local infiltration: lidocaine  Infiltration strength: 2 %  Local infiltration: lidocaine  Dose: 3 mL    Needle   Needle type: insulated echogenic nerve stimulator needle   Needle gauge: 20 G  Needle localization: ultrasound guidance  Needle length: 10 cm  Assessment   Injection assessment: negative aspiration for heme, no paresthesia on injection, local visualized surrounding nerve on ultrasound and no intravascular symptoms  Paresthesia pain: none  Slow fractionated injection: yes  Hemodynamics: stable  Real-time US image taken/store: yes  Outcomes: uncomplicated    Additional Notes  The patient was placed in a supine position. Chlorhexidine solution was used to prepare the area between the clavicle and mid neck. A time out was performed to identify the patient, procedure, laterality and allergies. An ultrasound probe was used for guidance. The subclavian artery and surround brachial plexus was identified in the supraclavicular view.  This was followed up along the neck until the C5, C6, C7 nerve roots were identified in between the anterior and middle scalene muscles. An echogenic needle was used in an in-plane technique to advance towards the nerve roots. Aliquots of local anesthetic solution were injected in 5 cc increments with confirmation of negative aspiration of blood with each aliquot. Repositioning of the needle was done after each aliquot to ensure good coverage of the nerve roots under ultrasound view. The patient tolerated the procedure well and in good condition.     Medications Administered  ropivacaine (NAROPIN) injection 0.5% - Perineural   15 mL - 3/15/2023 2:28:00 PM

## 2023-03-16 NOTE — ANESTHESIA POSTPROCEDURE EVALUATION
Department of Anesthesiology  Postprocedure Note    Patient: Johnny Escobar  MRN: [id number]  YOB: 1960  Date of evaluation: 3/16/2023      Procedure Summary     Date: 03/15/23 Room / Location: [address]    Anesthesia Start: 6963 Anesthesia Stop: 3845    Procedure: LEFT SHOULDER ARTHROSCOPY LABRAL DEBRIDEMENT, OPEN OSITO, SUBACROMIAL DECOMPRESSION AND ROTATOR CUFF REPAIR LEFT SHOULDER (Left: Shoulder) Diagnosis:       Complete tear of left rotator cuff, unspecified whether traumatic      Arthritis of left acromioclavicular joint      Tear of left glenoid labrum, initial encounter      (ROTATOR CUFF TEAR, ACROMIOCLAVICULAR JOINT ARTHRITIS, LABRAL TEAR LEFT SHOULDER)    Surgeons: Emry Apley, MD Responsible Provider: Jacque Melgar MD    Anesthesia Type: general, regional ASA Status: 3          Anesthesia Type: No value filed. Jonathan Phase I: Jonathan Score: 9    Jonathan Phase II: Jonathan Score: 10      Anesthesia Post Evaluation    Patient location during evaluation: PACU  Patient participation: complete - patient participated  Level of consciousness: awake  Airway patency: patent  Nausea & Vomiting: no nausea and no vomiting  Cardiovascular status: blood pressure returned to baseline  Respiratory status: acceptable  Hydration status: stable  Comments: Vital signs stable  OK to discharge from Stage I post anesthesia care.   Care transferred from Anesthesiology department on discharge from perioperative area   Multimodal analgesia pain management approach

## 2023-03-20 NOTE — TELEPHONE ENCOUNTER
Medical Question :      Contact Name: PATIENT  Contact Number: [phone]  Request or Information ABOUT REMOVING ARM FROM SLING. PLEASE CONTACT HIM.

## 2023-03-20 NOTE — TELEPHONE ENCOUNTER
S/P left shoulder arthroscopy; DOS 3/15/23. I spoke to pt. Per discharge instructions I let him know to move hand, wrist and elbow actively every hour while awake. Move shoulder gently in 5-7 days. He gave verbal understanding.

## 2023-03-24 ENCOUNTER — OFFICE VISIT (OUTPATIENT)
Dept: ORTHOPEDIC SURGERY | Age: 63
End: 2023-03-24

## 2023-03-24 VITALS — WEIGHT: 230 LBS | HEIGHT: 66 IN | BODY MASS INDEX: 36.96 KG/M2

## 2023-03-24 DIAGNOSIS — M75.122 COMPLETE TEAR OF LEFT ROTATOR CUFF, UNSPECIFIED WHETHER TRAUMATIC: Primary | ICD-10-CM

## 2023-03-24 DIAGNOSIS — M19.012 ARTHRITIS OF LEFT ACROMIOCLAVICULAR JOINT: ICD-10-CM

## 2023-03-24 NOTE — PROGRESS NOTES
Anita Cameron  [id number]  March 24, 2023    Chief Complaint   Patient presents with    Post-Op Check     Left shoulder arthroscopy; DOS 3/15/23. History: Patient is a 59-year-old gentleman who is here in follow-up regarding his left shoulder. He underwent left shoulder rotator cuff repair days ago. He is doing extremely well. He rates his pain as 0 - 2/10. He denies any numbness or tingling. He reports no issues with anesthesia. This is a Worker's Compensation case. The patient's  past medical history, medications, allergies,  family history, social history, and review of systems have been reviewed, and dated and are recorded in the chart. Ht 5' 6\" (1.676 m)   Wt 230 lb (104.3 kg)   BMI 37.12 kg/m²     Physical:  Mr. Anita Cameron appears well, he is in no apparent distress, he demonstrates appropriate mood & affect. He is alert and oriented to person, place and time. He has mild swelling. He is neurovascularly intact distally. Sensation is intact in the axillary nerve distribution. left shoulder range of motion: 30 degrees abduction, 40 degrees forward flexion, 30 degrees external rotation and internal rotation to L5. He has minimal pain with light range of motion of the shoulder. The incisions are clean, dry, and intact and without erythema. Strength in the shoulder is: 3/5  Abduction and 3/5 external rotation. X-Rays: AP and outlet of the left shoulder was obtained and reviewed. The distal clavicle resection is adequate and the decompression is acceptable. Impression: status post left shoulder arthroscopy, subacromial decompression and nick and rotator cuff repair    Plan: At this time the patient will begin an outpatient PT program to work aggressively on passive range of motion. He will continue to avoid overhead activities and limit his pushing, pulling and lifting. 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 He will remain off work until follow-up.

## 2023-03-31 ENCOUNTER — TELEPHONE (OUTPATIENT)
Dept: ORTHOPEDIC SURGERY | Age: 63
End: 2023-03-31

## 2023-03-31 DIAGNOSIS — Z98.890 S/P ARTHROSCOPY OF LEFT SHOULDER: ICD-10-CM

## 2023-03-31 DIAGNOSIS — M75.122 COMPLETE TEAR OF LEFT ROTATOR CUFF, UNSPECIFIED WHETHER TRAUMATIC: Primary | ICD-10-CM

## 2023-04-02 NOTE — PROGRESS NOTES
4/3/2023    Maria Alejandra Ty (:  1960) is a 58 y.o. male, here for evaluation of the following chief complaint(s):  Hypertension (Pt states bp has been up really high recently. Pt states he hasn't been taking his bp medications as much since he has a brace for his shoulder. Pt has been taking his bp medication everyday every morning for about a week and a half now. )      ASSESSMENT/PLAN:     Diagnosis Orders   1. Primary hypertension  Basic Metabolic Panel    at goal cont meds check renal      2. Prediabetes      a1c LCIF      3. Dyslipidemia  CT CARDIAC CALCIUM SCORING    advised new RX statin lanie given Fam Hx CAD; consider CCCT          Return in about 6 months (around 10/3/2023) for Well Adult, screen psa, HTN, Metabolic syndrome, Hyperlipidemia. An electronic signature was used to authenticate this note.     SUBJECTIVE/OBJECTIVE:  (NOTE : prior results listed below reviewed at this visit to assist in medical decision making.)    HPI / ROS    # had left rotator cuff injury at work; [facility]; surgery done now in therapy    # HTN - ysabel meds no CP/SOB  BP Readings from Last 3 Encounters:   23 126/84   03/15/23 (!) 136/103   23 135/86     Lab Results   Component Value Date/Time     2023 09:48 AM    K 4.2 2023 09:48 AM     2023 09:48 AM    CO2 25 2023 09:48 AM    BUN 22 2023 09:48 AM    CREATININE 0.9 2023 09:48 AM    GLUCOSE 124 2023 09:48 AM    CALCIUM 9.4 2023 09:48 AM       # PSA screening history:  Lab Results   Component Value Date    PSA 0.57 10/03/2022    PSA 0.56 2021    PSA 0.58 2019   UTD    # Lipids - recent screening history: UTD    The 10-year ASCVD risk score (Naomi CLARK, et al., 2019) is: 13.3%    Values used to calculate the score:      Age: 58 years      Sex: Male      Is Non- : No      Diabetic: No      Tobacco smoker: No      Systolic Blood Pressure: 985 mmHg      Is BP treated:

## 2023-04-03 ENCOUNTER — OFFICE VISIT (OUTPATIENT)
Dept: FAMILY MEDICINE CLINIC | Age: 63
End: 2023-04-03
Payer: COMMERCIAL

## 2023-04-03 ENCOUNTER — TELEPHONE (OUTPATIENT)
Dept: ORTHOPEDIC SURGERY | Age: 63
End: 2023-04-03

## 2023-04-03 VITALS
BODY MASS INDEX: 39.34 KG/M2 | HEIGHT: 66 IN | OXYGEN SATURATION: 96 % | HEART RATE: 95 BPM | SYSTOLIC BLOOD PRESSURE: 126 MMHG | DIASTOLIC BLOOD PRESSURE: 84 MMHG | WEIGHT: 244.8 LBS

## 2023-04-03 DIAGNOSIS — I10 PRIMARY HYPERTENSION: Primary | ICD-10-CM

## 2023-04-03 DIAGNOSIS — E78.5 DYSLIPIDEMIA: ICD-10-CM

## 2023-04-03 DIAGNOSIS — R73.03 PREDIABETES: ICD-10-CM

## 2023-04-03 LAB
ANION GAP SERPL CALCULATED.3IONS-SCNC: 13 MMOL/L (ref 3–16)
BUN SERPL-MCNC: 22 MG/DL (ref 7–20)
CALCIUM SERPL-MCNC: 9.4 MG/DL (ref 8.3–10.6)
CHLORIDE SERPL-SCNC: 101 MMOL/L (ref 99–110)
CO2 SERPL-SCNC: 25 MMOL/L (ref 21–32)
CREAT SERPL-MCNC: 0.9 MG/DL (ref 0.8–1.3)
GFR SERPLBLD CREATININE-BSD FMLA CKD-EPI: >60 ML/MIN/{1.73_M2}
GLUCOSE SERPL-MCNC: 124 MG/DL (ref 70–99)
POTASSIUM SERPL-SCNC: 4.2 MMOL/L (ref 3.5–5.1)
SODIUM SERPL-SCNC: 139 MMOL/L (ref 136–145)

## 2023-04-03 PROCEDURE — 3078F DIAST BP <80 MM HG: CPT | Performed by: FAMILY MEDICINE

## 2023-04-03 PROCEDURE — 99214 OFFICE O/P EST MOD 30 MIN: CPT | Performed by: FAMILY MEDICINE

## 2023-04-03 PROCEDURE — 36415 COLL VENOUS BLD VENIPUNCTURE: CPT | Performed by: FAMILY MEDICINE

## 2023-04-03 PROCEDURE — 3074F SYST BP LT 130 MM HG: CPT | Performed by: FAMILY MEDICINE

## 2023-04-03 SDOH — ECONOMIC STABILITY: FOOD INSECURITY: WITHIN THE PAST 12 MONTHS, YOU WORRIED THAT YOUR FOOD WOULD RUN OUT BEFORE YOU GOT MONEY TO BUY MORE.: NEVER TRUE

## 2023-04-03 SDOH — ECONOMIC STABILITY: FOOD INSECURITY: WITHIN THE PAST 12 MONTHS, THE FOOD YOU BOUGHT JUST DIDN'T LAST AND YOU DIDN'T HAVE MONEY TO GET MORE.: NEVER TRUE

## 2023-04-03 SDOH — ECONOMIC STABILITY: INCOME INSECURITY: HOW HARD IS IT FOR YOU TO PAY FOR THE VERY BASICS LIKE FOOD, HOUSING, MEDICAL CARE, AND HEATING?: NOT HARD AT ALL

## 2023-04-03 SDOH — ECONOMIC STABILITY: HOUSING INSECURITY
IN THE LAST 12 MONTHS, WAS THERE A TIME WHEN YOU DID NOT HAVE A STEADY PLACE TO SLEEP OR SLEPT IN A SHELTER (INCLUDING NOW)?: NO

## 2023-04-03 ASSESSMENT — PATIENT HEALTH QUESTIONNAIRE - PHQ9
2. FEELING DOWN, DEPRESSED OR HOPELESS: 0
1. LITTLE INTEREST OR PLEASURE IN DOING THINGS: 0
SUM OF ALL RESPONSES TO PHQ9 QUESTIONS 1 & 2: 0
SUM OF ALL RESPONSES TO PHQ QUESTIONS 1-9: 0

## 2023-04-03 NOTE — TELEPHONE ENCOUNTER
I called [facility] and spoke to Tran Guan. The instructions are in the order sent, and are as following:  Aggressive passive range of motion. Modalities of Choice, Home exercise Program.     Tran Guan gave verbal understanding.

## 2023-04-03 NOTE — TELEPHONE ENCOUNTER
[facility]  Called and requested the patient protocol of his L shoulder .  She states she keep getting the prescription she does not need that  just his protocol  Fax [phone]

## 2023-04-21 ENCOUNTER — TELEPHONE (OUTPATIENT)
Dept: ORTHOPEDIC SURGERY | Age: 63
End: 2023-04-21

## 2023-04-21 ENCOUNTER — OFFICE VISIT (OUTPATIENT)
Dept: ORTHOPEDIC SURGERY | Age: 63
End: 2023-04-21

## 2023-04-21 VITALS — HEIGHT: 66 IN | WEIGHT: 239 LBS | BODY MASS INDEX: 38.41 KG/M2

## 2023-04-21 DIAGNOSIS — M75.122 COMPLETE TEAR OF LEFT ROTATOR CUFF, UNSPECIFIED WHETHER TRAUMATIC: ICD-10-CM

## 2023-04-21 DIAGNOSIS — M19.012 ARTHRITIS OF LEFT ACROMIOCLAVICULAR JOINT: Primary | ICD-10-CM

## 2023-04-21 DIAGNOSIS — S43.432A TEAR OF LEFT GLENOID LABRUM, INITIAL ENCOUNTER: ICD-10-CM

## 2023-04-21 DIAGNOSIS — Z98.890 S/P ARTHROSCOPY OF LEFT SHOULDER: ICD-10-CM

## 2023-04-21 NOTE — PROGRESS NOTES
Fausto Lombardo  [id number]  April 21, 2023    Chief Complaint   Patient presents with    Follow-up     [facility], left shoulder. S/P arthroscopy, subacromial decompression and nick and rotator cuff repair; DOS 3/15/23. History: Patient is a 58-year-old gentleman who is here in follow-up regarding his left shoulder. He underwent left shoulder rotator cuff repair approximately 5-1/2 weeks ago . He is doing extremely well. He rates his pain as 0 - 2/10. He denies any numbness or tingling. He reports no issues with anesthesia. This is a Worker's Compensation case. He is progressing rather well in therapy. The patient's  past medical history, medications, allergies,  family history, social history, and review of systems have been reviewed, and dated and are recorded in the chart. Ht 5' 6\" (1.676 m)   Wt 239 lb (108.4 kg)   BMI 38.58 kg/m²     Physical:  Mr. Fausto Lombardo appears well, he is in no apparent distress, he demonstrates appropriate mood & affect. He is alert and oriented to person, place and time. He has mild swelling. He is neurovascularly intact distally. Sensation is intact in the axillary nerve distribution. left shoulder range of motion: 150 degrees abduction, 160 degrees forward flexion, 40 degrees external rotation and internal rotation to L4. He has minimal pain with light range of motion of the shoulder. The incisions are clean, dry, and intact and without erythema. Strength in the shoulder is: 4/5  Abduction and 4/5 external rotation. X-Rays: AP and outlet of the left shoulder obtained at last visit were again reviewed. The distal clavicle resection is adequate and the decompression is acceptable. Impression: status post left shoulder arthroscopy, subacromial decompression and nick and rotator cuff repair    Plan: At this time the patient will continue with his outpatient physical therapy program.  We will begin active and active assisted range of motion.   He

## 2023-04-21 NOTE — TELEPHONE ENCOUNTER
Please submit a C9 requesting additional physical therapy visits. Physical therapy order with diagnosis placed in patients chart.

## 2023-04-26 ENCOUNTER — TELEPHONE (OUTPATIENT)
Dept: ORTHOPEDIC SURGERY | Age: 63
End: 2023-04-26

## 2023-04-26 NOTE — TELEPHONE ENCOUNTER
Other S/W pt, he stated that the pt order from Dr. Shey Hathaway wasn't signed and [place]cheo Davies is needing the pt order with a signature. Patient Ph# [phone].

## 2023-04-26 NOTE — TELEPHONE ENCOUNTER
I spoke to pt. He is unclear exactly what is needed. He will get more information, or have the a request faxed to us.

## 2023-05-18 ENCOUNTER — TELEPHONE (OUTPATIENT)
Dept: FAMILY MEDICINE CLINIC | Age: 63
End: 2023-05-18

## 2023-05-18 NOTE — TELEPHONE ENCOUNTER
Pt called requesting a call back with the results from his CT scan of a couple weeks ago.  Pt is reachable at [phone]

## 2023-05-18 NOTE — TELEPHONE ENCOUNTER
His cardiac calcium score is 3 which is very low. Insignificant plaque burden and very low probability of coronary artery disease. Please let me know if he has any other questions. Please document call and then close encounter.   thanks

## 2023-05-26 ENCOUNTER — OFFICE VISIT (OUTPATIENT)
Dept: ORTHOPEDIC SURGERY | Age: 63
End: 2023-05-26

## 2023-05-26 VITALS — HEIGHT: 66 IN | WEIGHT: 239 LBS | BODY MASS INDEX: 38.41 KG/M2

## 2023-05-26 DIAGNOSIS — M75.122 COMPLETE TEAR OF LEFT ROTATOR CUFF, UNSPECIFIED WHETHER TRAUMATIC: Primary | ICD-10-CM

## 2023-05-26 NOTE — PROGRESS NOTES
Aleshia Lan  [id number]  May 26, 2023    Chief Complaint   Patient presents with    Follow-up     [facility] Arthritis of left acromioclavicular joint       History: Patient is a 26-year-old gentleman who is here in follow-up regarding his left shoulder. He underwent left shoulder rotator cuff repair approximately 2-1/2 months ago . He is doing extremely well. He rates his pain as 0 - 2/10. He denies any numbness or tingling. He reports no issues with anesthesia. This is a Worker's Compensation case. He is progressing rather well in therapy. The patient's  past medical history, medications, allergies,  family history, social history, and review of systems have been reviewed, and dated and are recorded in the chart. Ht 5' 6\" (1.676 m)   Wt 239 lb (108.4 kg)   BMI 38.58 kg/m²     Physical:  Mr. Aleshia Lan appears well, he is in no apparent distress, he demonstrates appropriate mood & affect. He is alert and oriented to person, place and time. He has mild swelling. He is neurovascularly intact distally. Sensation is intact in the axillary nerve distribution. left shoulder range of motion: 165 degrees abduction, 170 degrees forward flexion, 40 degrees external rotation and internal rotation to L 3. He has minimal pain with light range of motion of the shoulder. The incisions are clean, dry, and intact and without erythema. Strength in the shoulder is: 4+/5  Abduction and 4+/5 external rotation. X-Rays: AP and outlet of the left shoulder obtained at last visit were again reviewed. The distal clavicle resection is adequate and the decompression is acceptable. Impression: status post left shoulder arthroscopy, subacromial decompression and nick and rotator cuff repair    Plan: At this time the patient will continue with his outpatient physical therapy program.  The patient will continue to work on his active range of motion. He will also work on strengthening.   The patient does work in a

## 2023-06-01 ENCOUNTER — TELEPHONE (OUTPATIENT)
Dept: ORTHOPEDIC SURGERY | Age: 63
End: 2023-06-01

## 2023-06-01 NOTE — TELEPHONE ENCOUNTER
Faxing Patient [facility] Name: [address]  Attn: Rachel Ramos Name: Angela George  Contact Number: [phone]  Facility Fax Number: [phone]       Patient asking for an *Updated RTW Note/slip* indicating restrictions for employer. Previous RTW note 04.21.23., Last appt 05.26.23 notes, indicated restrictions still in place for 1 more month. Patient asking letter to be faxed to: Employer: [address][phone] Attn: Cher Ely.       Please advise

## 2023-07-13 ENCOUNTER — OFFICE VISIT (OUTPATIENT)
Dept: ORTHOPEDIC SURGERY | Age: 63
End: 2023-07-13

## 2023-07-13 VITALS — BODY MASS INDEX: 38.25 KG/M2 | WEIGHT: 238 LBS | HEIGHT: 66 IN

## 2023-07-13 DIAGNOSIS — S43.432A TEAR OF LEFT GLENOID LABRUM, INITIAL ENCOUNTER: ICD-10-CM

## 2023-07-13 DIAGNOSIS — M75.122 COMPLETE TEAR OF LEFT ROTATOR CUFF, UNSPECIFIED WHETHER TRAUMATIC: Primary | ICD-10-CM

## 2023-07-13 DIAGNOSIS — M19.012 ARTHRITIS OF LEFT ACROMIOCLAVICULAR JOINT: ICD-10-CM

## 2023-10-02 ENCOUNTER — OFFICE VISIT (OUTPATIENT)
Dept: FAMILY MEDICINE CLINIC | Age: 63
End: 2023-10-02

## 2023-10-02 VITALS
RESPIRATION RATE: 16 BRPM | DIASTOLIC BLOOD PRESSURE: 80 MMHG | WEIGHT: 242 LBS | HEIGHT: 66 IN | BODY MASS INDEX: 38.89 KG/M2 | HEART RATE: 73 BPM | OXYGEN SATURATION: 97 % | SYSTOLIC BLOOD PRESSURE: 128 MMHG

## 2023-10-02 DIAGNOSIS — Z12.5 SCREENING PSA (PROSTATE SPECIFIC ANTIGEN): ICD-10-CM

## 2023-10-02 DIAGNOSIS — Z00.00 ROUTINE GENERAL MEDICAL EXAMINATION AT A HEALTH CARE FACILITY: Primary | ICD-10-CM

## 2023-10-02 DIAGNOSIS — I10 PRIMARY HYPERTENSION: ICD-10-CM

## 2023-10-02 DIAGNOSIS — R73.03 PREDIABETES: ICD-10-CM

## 2023-10-02 DIAGNOSIS — Z23 IMMUNIZATION DUE: ICD-10-CM

## 2023-10-02 DIAGNOSIS — E78.5 DYSLIPIDEMIA: ICD-10-CM

## 2023-10-02 DIAGNOSIS — Z82.49 FH: CAD (CORONARY ARTERY DISEASE): ICD-10-CM

## 2023-10-02 LAB
ANION GAP SERPL CALCULATED.3IONS-SCNC: 7 MMOL/L (ref 3–16)
BUN SERPL-MCNC: 19 MG/DL (ref 7–20)
CALCIUM SERPL-MCNC: 8.9 MG/DL (ref 8.3–10.6)
CHLORIDE SERPL-SCNC: 105 MMOL/L (ref 99–110)
CHOLEST SERPL-MCNC: 151 MG/DL (ref 0–199)
CO2 SERPL-SCNC: 28 MMOL/L (ref 21–32)
CREAT SERPL-MCNC: 0.8 MG/DL (ref 0.8–1.3)
GFR SERPLBLD CREATININE-BSD FMLA CKD-EPI: >60 ML/MIN/{1.73_M2}
GLUCOSE SERPL-MCNC: 112 MG/DL (ref 70–99)
HDLC SERPL-MCNC: 33 MG/DL (ref 40–60)
LDLC SERPL CALC-MCNC: 102 MG/DL
POTASSIUM SERPL-SCNC: 4.4 MMOL/L (ref 3.5–5.1)
PSA SERPL DL<=0.01 NG/ML-MCNC: 0.47 NG/ML (ref 0–4)
SODIUM SERPL-SCNC: 140 MMOL/L (ref 136–145)
TRIGL SERPL-MCNC: 82 MG/DL (ref 0–150)
VLDLC SERPL CALC-MCNC: 16 MG/DL

## 2023-10-02 SDOH — ECONOMIC STABILITY: INCOME INSECURITY: HOW HARD IS IT FOR YOU TO PAY FOR THE VERY BASICS LIKE FOOD, HOUSING, MEDICAL CARE, AND HEATING?: NOT HARD AT ALL

## 2023-10-02 SDOH — ECONOMIC STABILITY: FOOD INSECURITY: WITHIN THE PAST 12 MONTHS, THE FOOD YOU BOUGHT JUST DIDN'T LAST AND YOU DIDN'T HAVE MONEY TO GET MORE.: NEVER TRUE

## 2023-10-02 SDOH — ECONOMIC STABILITY: FOOD INSECURITY: WITHIN THE PAST 12 MONTHS, YOU WORRIED THAT YOUR FOOD WOULD RUN OUT BEFORE YOU GOT MONEY TO BUY MORE.: NEVER TRUE

## 2023-10-02 ASSESSMENT — PATIENT HEALTH QUESTIONNAIRE - PHQ9
SUM OF ALL RESPONSES TO PHQ QUESTIONS 1-9: 0
SUM OF ALL RESPONSES TO PHQ QUESTIONS 1-9: 0
1. LITTLE INTEREST OR PLEASURE IN DOING THINGS: 0
2. FEELING DOWN, DEPRESSED OR HOPELESS: 0
SUM OF ALL RESPONSES TO PHQ9 QUESTIONS 1 & 2: 0
SUM OF ALL RESPONSES TO PHQ QUESTIONS 1-9: 0
SUM OF ALL RESPONSES TO PHQ QUESTIONS 1-9: 0

## 2023-10-02 NOTE — PROGRESS NOTES
10/2/2023    Derick Alexis (:  1960) is a 61 y.o. male, here for evaluation of the following chief complaint(s):  Hypertension and Annual Exam      ASSESSMENT/PLAN:     Diagnosis Orders   1. Routine general medical examination at a health care facility        2. Screening PSA (prostate specific antigen)  PSA Screening      3. Primary hypertension  Basic Metabolic Panel    at goal cont meds check renal      4. Prediabetes  Hemoglobin A1C    a1c      5. Dyslipidemia  LIPID PANEL    lipids on diet tx      6. FH: CAD (coronary artery disease)      reviewed CCCT low (~4)        7. Immunization due  Tdap, BOOSTRIX, (age 8 yrs+), IM          Return in about 6 months (around 2024) for HTN. An electronic signature was used to authenticate this note.     SUBJECTIVE/OBJECTIVE:  (NOTE : prior results listed below reviewed at this visit to assist in medical decision making.)    HPI / ROS    # Preventive and other issues  # PSA screening history:  Lab Results   Component Value Date    PSA 0.57 10/03/2022    PSA 0.56 2021    PSA 0.58 2019     # Lipids - recent screening history:  Lab Results   Component Value Date    LDLCALC 118 (H) 10/03/2022     Lab Results   Component Value Date    TRIG 126 10/03/2022     Lab Results   Component Value Date    HDL 34 (L) 10/03/2022     Lab Results   Component Value Date    CHOL 177 10/03/2022     The 10-year ASCVD risk score (Naomi CLARK, et al., 2019) is: 14.7%    Values used to calculate the score:      Age: 61 years      Sex: Male      Is Non- : No      Diabetic: No      Tobacco smoker: No      Systolic Blood Pressure: 953 mmHg      Is BP treated: Yes      HDL Cholesterol: 34 mg/dL      Total Cholesterol: 177 mg/dL            Wt Readings from Last 3 Encounters:   10/02/23 242 lb (109.8 kg)   23 238 lb (108 kg)   23 239 lb (108.4 kg)       BP Readings from Last 3 Encounters:   10/02/23 128/80   23 126/84   03/15/23 (!)

## 2023-10-03 ENCOUNTER — TELEPHONE (OUTPATIENT)
Dept: FAMILY MEDICINE CLINIC | Age: 63
End: 2023-10-03

## 2023-10-03 LAB
EST. AVERAGE GLUCOSE BLD GHB EST-MCNC: 114 MG/DL
HBA1C MFR BLD: 5.6 %

## 2023-10-03 NOTE — TELEPHONE ENCOUNTER
Roseanne Yost   10/3/2023 12:03 PM EDT Back to Top      Attempted to call pt regarding below. Could not leave a vm as mailbox is not set up. Will try again. Ta Shaw MD   10/3/2023 11:51 AM EDT       Labs all OK  A1c good == 5.6   Pt returned call and I advised of notes.  Pt stated understanding

## 2023-11-02 ENCOUNTER — TELEPHONE (OUTPATIENT)
Dept: SURGERY | Age: 63
End: 2023-11-02

## 2023-11-02 NOTE — TELEPHONE ENCOUNTER
Patient calling stating that he wants to verify a few things prior to surgery. Please call patient at [phone].

## 2023-11-09 ENCOUNTER — TELEPHONE (OUTPATIENT)
Dept: FAMILY MEDICINE CLINIC | Age: 63
End: 2023-11-09

## 2023-11-09 NOTE — TELEPHONE ENCOUNTER
Brenda from dr Clarisa Cevallos office called asking if dr Fausto Summers would change pt office visit to a pre op visit. Please advise.

## 2023-11-13 ENCOUNTER — OFFICE VISIT (OUTPATIENT)
Dept: FAMILY MEDICINE CLINIC | Age: 63
End: 2023-11-13
Payer: COMMERCIAL

## 2023-11-13 VITALS
TEMPERATURE: 97.7 F | DIASTOLIC BLOOD PRESSURE: 80 MMHG | HEIGHT: 66 IN | SYSTOLIC BLOOD PRESSURE: 134 MMHG | OXYGEN SATURATION: 99 % | BODY MASS INDEX: 37.77 KG/M2 | HEART RATE: 100 BPM | WEIGHT: 235 LBS

## 2023-11-13 DIAGNOSIS — Z01.818 PRE-OP EVALUATION: Primary | ICD-10-CM

## 2023-11-13 LAB
BASOPHILS # BLD: 0.1 K/UL (ref 0–0.2)
BASOPHILS NFR BLD: 0.7 %
DEPRECATED RDW RBC AUTO: 13.3 % (ref 12.4–15.4)
EOSINOPHIL # BLD: 0.4 K/UL (ref 0–0.6)
EOSINOPHIL NFR BLD: 4.9 %
HCT VFR BLD AUTO: 45.9 % (ref 40.5–52.5)
HGB BLD-MCNC: 15.9 G/DL (ref 13.5–17.5)
LYMPHOCYTES # BLD: 1.6 K/UL (ref 1–5.1)
LYMPHOCYTES NFR BLD: 19.6 %
MCH RBC QN AUTO: 33.7 PG (ref 26–34)
MCHC RBC AUTO-ENTMCNC: 34.7 G/DL (ref 31–36)
MCV RBC AUTO: 97.1 FL (ref 80–100)
MONOCYTES # BLD: 0.8 K/UL (ref 0–1.3)
MONOCYTES NFR BLD: 10.4 %
NEUTROPHILS # BLD: 5.2 K/UL (ref 1.7–7.7)
NEUTROPHILS NFR BLD: 64.4 %
PLATELET # BLD AUTO: 250 K/UL (ref 135–450)
PMV BLD AUTO: 8.2 FL (ref 5–10.5)
RBC # BLD AUTO: 4.73 M/UL (ref 4.2–5.9)
WBC # BLD AUTO: 8.1 K/UL (ref 4–11)

## 2023-11-13 PROCEDURE — 99214 OFFICE O/P EST MOD 30 MIN: CPT | Performed by: NURSE PRACTITIONER

## 2023-11-13 PROCEDURE — 93000 ELECTROCARDIOGRAM COMPLETE: CPT | Performed by: NURSE PRACTITIONER

## 2023-11-13 PROCEDURE — 3079F DIAST BP 80-89 MM HG: CPT | Performed by: NURSE PRACTITIONER

## 2023-11-13 PROCEDURE — 3075F SYST BP GE 130 - 139MM HG: CPT | Performed by: NURSE PRACTITIONER

## 2023-11-13 PROCEDURE — 36415 COLL VENOUS BLD VENIPUNCTURE: CPT | Performed by: NURSE PRACTITIONER

## 2023-11-14 ENCOUNTER — TELEPHONE (OUTPATIENT)
Dept: FAMILY MEDICINE CLINIC | Age: 63
End: 2023-11-14

## 2023-11-14 LAB
ALBUMIN SERPL-MCNC: 4.4 G/DL (ref 3.4–5)
ALBUMIN/GLOB SERPL: 1.9 {RATIO} (ref 1.1–2.2)
ALP SERPL-CCNC: 108 U/L (ref 40–129)
ALT SERPL-CCNC: 24 U/L (ref 10–40)
ANION GAP SERPL CALCULATED.3IONS-SCNC: 10 MMOL/L (ref 3–16)
AST SERPL-CCNC: 21 U/L (ref 15–37)
BILIRUB SERPL-MCNC: 0.3 MG/DL (ref 0–1)
BUN SERPL-MCNC: 22 MG/DL (ref 7–20)
CALCIUM SERPL-MCNC: 9.5 MG/DL (ref 8.3–10.6)
CHLORIDE SERPL-SCNC: 106 MMOL/L (ref 99–110)
CO2 SERPL-SCNC: 26 MMOL/L (ref 21–32)
CREAT SERPL-MCNC: 0.8 MG/DL (ref 0.8–1.3)
GFR SERPLBLD CREATININE-BSD FMLA CKD-EPI: >60 ML/MIN/{1.73_M2}
GLUCOSE SERPL-MCNC: 128 MG/DL (ref 70–99)
POTASSIUM SERPL-SCNC: 3.8 MMOL/L (ref 3.5–5.1)
PROT SERPL-MCNC: 6.7 G/DL (ref 6.4–8.2)
SODIUM SERPL-SCNC: 142 MMOL/L (ref 136–145)

## 2023-11-14 NOTE — TELEPHONE ENCOUNTER
Brenda from dr Sarajane Lombard office called stating that she wanted to talk to elen and ask her if she addressed the lab results for the pt so they can move forward with the surgery.  Brenda is reachable at [phone]

## 2023-11-15 ENCOUNTER — OFFICE VISIT (OUTPATIENT)
Dept: CARDIOLOGY CLINIC | Age: 63
End: 2023-11-15
Payer: COMMERCIAL

## 2023-11-15 VITALS
DIASTOLIC BLOOD PRESSURE: 78 MMHG | HEART RATE: 92 BPM | BODY MASS INDEX: 38.25 KG/M2 | OXYGEN SATURATION: 97 % | HEIGHT: 66 IN | SYSTOLIC BLOOD PRESSURE: 130 MMHG | WEIGHT: 238 LBS

## 2023-11-15 DIAGNOSIS — I25.84 CORONARY ARTERY CALCIFICATION: ICD-10-CM

## 2023-11-15 DIAGNOSIS — I25.10 CORONARY ARTERY CALCIFICATION: ICD-10-CM

## 2023-11-15 DIAGNOSIS — E78.2 MIXED HYPERLIPIDEMIA: ICD-10-CM

## 2023-11-15 DIAGNOSIS — I45.10 RBBB: ICD-10-CM

## 2023-11-15 DIAGNOSIS — I10 ESSENTIAL HYPERTENSION: ICD-10-CM

## 2023-11-15 DIAGNOSIS — Z01.818 PRE-OPERATIVE EXAMINATION: Primary | ICD-10-CM

## 2023-11-15 PROCEDURE — 3078F DIAST BP <80 MM HG: CPT | Performed by: INTERNAL MEDICINE

## 2023-11-15 PROCEDURE — 3075F SYST BP GE 130 - 139MM HG: CPT | Performed by: INTERNAL MEDICINE

## 2023-11-15 PROCEDURE — 99204 OFFICE O/P NEW MOD 45 MIN: CPT | Performed by: INTERNAL MEDICINE

## 2023-11-15 NOTE — PROGRESS NOTES
[facility]      Cardiology Consult    Karely Roth  [birth year]    November 15, 2023    Referring Physician: Stacy Weinstein MD  Reason for Referral: preoperative risk assessment    CC: \"I had an abnormal EKG\"    HPI:  The patient is 61 y.o. male with a past medical history significant for hypertension presents for pre-operative risk assessment. The patient has no cardiac sounding complaints. He describes a functional status >4 METs without symptoms. He denies a history of MI, CVA, DM, CHF, or CKD. He was told he had an \"abnormal EKG\" which appears unchanged from prior with a RBBB. Patient denies exertional chest pain/pressure, dyspnea at rest, CORBETT, PND, orthopnea, palpitations, lightheadedness, weight changes, changes in LE edema, and syncope.     Past Medical History:   Diagnosis Date    Class 2 obesity     Hypertension      Past Surgical History:   Procedure Laterality Date    COLONOSCOPY  11/2022    EYE SURGERY Left 11/2022    detached retina    SHOULDER ARTHROSCOPY Left 03/15/2023    LEFT SHOULDER ARTHROSCOPY LABRAL DEBRIDEMENT, OPEN OSITO, SUBACROMIAL DECOMPRESSION AND ROTATOR CUFF REPAIR LEFT SHOULDER performed by Philippe Fenton MD at [facility] History   Problem Relation Age of Onset    No Known Problems Mother     Diabetes Father     Coronary Art Dis Father 61    Hypertension Father     Diabetes Brother     Diabetes Maternal Grandfather     Diabetes Paternal Grandfather      Social History     Tobacco Use    Smoking status: Never    Smokeless tobacco: Never   Vaping Use    Vaping Use: Never used   Substance Use Topics    Alcohol use: No    Drug use: Never       No Known Allergies  Current Outpatient Medications   Medication Sig Dispense Refill    lisinopril-hydroCHLOROthiazide (PRINZIDE;ZESTORETIC) 20-12.5 MG per tablet TAKE ONE TABLET BY MOUTH DAILY 90 tablet 3    aspirin 81 MG tablet Take 1 tablet by mouth daily       No current facility-administered medications for

## 2023-11-17 ENCOUNTER — ANESTHESIA EVENT (OUTPATIENT)
Dept: OPERATING ROOM | Age: 63
End: 2023-11-17
Payer: COMMERCIAL

## 2023-11-20 ENCOUNTER — ANESTHESIA (OUTPATIENT)
Dept: OPERATING ROOM | Age: 63
End: 2023-11-20
Payer: COMMERCIAL

## 2023-11-20 ENCOUNTER — HOSPITAL ENCOUNTER (OUTPATIENT)
Age: 63
Setting detail: OUTPATIENT SURGERY
Discharge: HOME | End: 2023-11-20
Attending: SURGERY | Admitting: SURGERY
Payer: COMMERCIAL

## 2023-11-20 VITALS
BODY MASS INDEX: 38.64 KG/M2 | HEIGHT: 66 IN | OXYGEN SATURATION: 98 % | SYSTOLIC BLOOD PRESSURE: 133 MMHG | RESPIRATION RATE: 16 BRPM | WEIGHT: 240.41 LBS | HEART RATE: 77 BPM | TEMPERATURE: 97.2 F | DIASTOLIC BLOOD PRESSURE: 87 MMHG

## 2023-11-20 DIAGNOSIS — K40.90 LEFT INGUINAL HERNIA: ICD-10-CM

## 2023-11-20 PROCEDURE — 3600000013 HC SURGERY LEVEL 3 ADDTL 15MIN: Performed by: SURGERY

## 2023-11-20 PROCEDURE — 2580000003 HC RX 258: Performed by: SURGERY

## 2023-11-20 PROCEDURE — 88302 TISSUE EXAM BY PATHOLOGIST: CPT

## 2023-11-20 PROCEDURE — 6360000002 HC RX W HCPCS

## 2023-11-20 PROCEDURE — 6360000002 HC RX W HCPCS: Performed by: SURGERY

## 2023-11-20 PROCEDURE — 2709999900 HC NON-CHARGEABLE SUPPLY: Performed by: SURGERY

## 2023-11-20 PROCEDURE — 2500000003 HC RX 250 WO HCPCS: Performed by: SURGERY

## 2023-11-20 PROCEDURE — 7100000010 HC PHASE II RECOVERY - FIRST 15 MIN: Performed by: SURGERY

## 2023-11-20 PROCEDURE — 7100000000 HC PACU RECOVERY - FIRST 15 MIN: Performed by: SURGERY

## 2023-11-20 PROCEDURE — 7100000011 HC PHASE II RECOVERY - ADDTL 15 MIN: Performed by: SURGERY

## 2023-11-20 PROCEDURE — 6370000000 HC RX 637 (ALT 250 FOR IP): Performed by: ANESTHESIOLOGY

## 2023-11-20 PROCEDURE — 2580000003 HC RX 258: Performed by: ANESTHESIOLOGY

## 2023-11-20 PROCEDURE — 3700000000 HC ANESTHESIA ATTENDED CARE: Performed by: SURGERY

## 2023-11-20 PROCEDURE — 7100000001 HC PACU RECOVERY - ADDTL 15 MIN: Performed by: SURGERY

## 2023-11-20 PROCEDURE — A4217 STERILE WATER/SALINE, 500 ML: HCPCS | Performed by: SURGERY

## 2023-11-20 PROCEDURE — 2500000003 HC RX 250 WO HCPCS

## 2023-11-20 PROCEDURE — 49505 PRP I/HERN INIT REDUC >5 YR: CPT | Performed by: SURGERY

## 2023-11-20 PROCEDURE — C1781 MESH (IMPLANTABLE): HCPCS | Performed by: SURGERY

## 2023-11-20 PROCEDURE — 3700000001 HC ADD 15 MINUTES (ANESTHESIA): Performed by: SURGERY

## 2023-11-20 PROCEDURE — 3600000003 HC SURGERY LEVEL 3 BASE: Performed by: SURGERY

## 2023-11-20 DEVICE — MESH HERN W3XL6IN INGUINAL POLYPR MFIL RECTANG: Type: IMPLANTABLE DEVICE | Site: GROIN | Status: FUNCTIONAL

## 2023-11-20 RX ORDER — LIDOCAINE HYDROCHLORIDE 20 MG/ML
INJECTION, SOLUTION EPIDURAL; INFILTRATION; INTRACAUDAL; PERINEURAL PRN
Status: DISCONTINUED | OUTPATIENT
Start: 2023-11-20 | End: 2023-11-20 | Stop reason: SDUPTHER

## 2023-11-20 RX ORDER — MAGNESIUM HYDROXIDE 1200 MG/15ML
LIQUID ORAL CONTINUOUS PRN
Status: DISCONTINUED | OUTPATIENT
Start: 2023-11-20 | End: 2023-11-20 | Stop reason: HOSPADM

## 2023-11-20 RX ORDER — OXYCODONE HYDROCHLORIDE 5 MG/1
5 TABLET ORAL ONCE
Status: COMPLETED | OUTPATIENT
Start: 2023-11-20 | End: 2023-11-20

## 2023-11-20 RX ORDER — MIDAZOLAM HYDROCHLORIDE 1 MG/ML
INJECTION INTRAMUSCULAR; INTRAVENOUS PRN
Status: DISCONTINUED | OUTPATIENT
Start: 2023-11-20 | End: 2023-11-20 | Stop reason: SDUPTHER

## 2023-11-20 RX ORDER — PROPOFOL 10 MG/ML
INJECTION, EMULSION INTRAVENOUS CONTINUOUS PRN
Status: DISCONTINUED | OUTPATIENT
Start: 2023-11-20 | End: 2023-11-20 | Stop reason: SDUPTHER

## 2023-11-20 RX ORDER — FENTANYL CITRATE 50 UG/ML
25 INJECTION, SOLUTION INTRAMUSCULAR; INTRAVENOUS EVERY 5 MIN PRN
Status: DISCONTINUED | OUTPATIENT
Start: 2023-11-20 | End: 2023-11-20 | Stop reason: HOSPADM

## 2023-11-20 RX ORDER — SODIUM CHLORIDE 9 MG/ML
INJECTION, SOLUTION INTRAVENOUS PRN
Status: DISCONTINUED | OUTPATIENT
Start: 2023-11-20 | End: 2023-11-20 | Stop reason: HOSPADM

## 2023-11-20 RX ORDER — SODIUM CHLORIDE 0.9 % (FLUSH) 0.9 %
5-40 SYRINGE (ML) INJECTION PRN
Status: DISCONTINUED | OUTPATIENT
Start: 2023-11-20 | End: 2023-11-20 | Stop reason: HOSPADM

## 2023-11-20 RX ORDER — BUPIVACAINE HYDROCHLORIDE 5 MG/ML
INJECTION, SOLUTION EPIDURAL; INTRACAUDAL
Status: COMPLETED | OUTPATIENT
Start: 2023-11-20 | End: 2023-11-20

## 2023-11-20 RX ORDER — SODIUM CHLORIDE 0.9 % (FLUSH) 0.9 %
5-40 SYRINGE (ML) INJECTION EVERY 12 HOURS SCHEDULED
Status: DISCONTINUED | OUTPATIENT
Start: 2023-11-20 | End: 2023-11-20 | Stop reason: HOSPADM

## 2023-11-20 RX ORDER — LIDOCAINE HYDROCHLORIDE AND EPINEPHRINE 10; 10 MG/ML; UG/ML
INJECTION, SOLUTION INFILTRATION; PERINEURAL
Status: COMPLETED | OUTPATIENT
Start: 2023-11-20 | End: 2023-11-20

## 2023-11-20 RX ORDER — OXYCODONE HYDROCHLORIDE 5 MG/1
5 TABLET ORAL EVERY 6 HOURS PRN
Qty: 20 TABLET | Refills: 0 | Status: SHIPPED | OUTPATIENT
Start: 2023-11-20 | End: 2023-11-25

## 2023-11-20 RX ORDER — FENTANYL CITRATE 50 UG/ML
INJECTION, SOLUTION INTRAMUSCULAR; INTRAVENOUS PRN
Status: DISCONTINUED | OUTPATIENT
Start: 2023-11-20 | End: 2023-11-20 | Stop reason: SDUPTHER

## 2023-11-20 RX ORDER — FENTANYL CITRATE 0.05 MG/ML
50 INJECTION, SOLUTION INTRAMUSCULAR; INTRAVENOUS EVERY 5 MIN PRN
Status: DISCONTINUED | OUTPATIENT
Start: 2023-11-20 | End: 2023-11-20 | Stop reason: HOSPADM

## 2023-11-20 RX ORDER — ONDANSETRON 2 MG/ML
4 INJECTION INTRAMUSCULAR; INTRAVENOUS
Status: DISCONTINUED | OUTPATIENT
Start: 2023-11-20 | End: 2023-11-20 | Stop reason: HOSPADM

## 2023-11-20 RX ORDER — MEPERIDINE HYDROCHLORIDE 25 MG/ML
12.5 INJECTION INTRAMUSCULAR; INTRAVENOUS; SUBCUTANEOUS
Status: DISCONTINUED | OUTPATIENT
Start: 2023-11-20 | End: 2023-11-20 | Stop reason: HOSPADM

## 2023-11-20 RX ADMIN — FENTANYL CITRATE 25 MCG: 50 INJECTION INTRAMUSCULAR; INTRAVENOUS at 10:23

## 2023-11-20 RX ADMIN — FENTANYL CITRATE 25 MCG: 50 INJECTION INTRAMUSCULAR; INTRAVENOUS at 10:35

## 2023-11-20 RX ADMIN — OXYCODONE HYDROCHLORIDE 5 MG: 5 TABLET ORAL at 12:21

## 2023-11-20 RX ADMIN — MIDAZOLAM 2 MG: 1 INJECTION INTRAMUSCULAR; INTRAVENOUS at 10:06

## 2023-11-20 RX ADMIN — PROPOFOL 180 MCG/KG/MIN: 10 INJECTION, EMULSION INTRAVENOUS at 10:09

## 2023-11-20 RX ADMIN — FENTANYL CITRATE 25 MCG: 50 INJECTION INTRAMUSCULAR; INTRAVENOUS at 10:17

## 2023-11-20 RX ADMIN — SODIUM CHLORIDE 2000 MG: 900 INJECTION INTRAVENOUS at 10:06

## 2023-11-20 RX ADMIN — FENTANYL CITRATE 25 MCG: 50 INJECTION INTRAMUSCULAR; INTRAVENOUS at 10:14

## 2023-11-20 RX ADMIN — SODIUM CHLORIDE: 9 INJECTION, SOLUTION INTRAVENOUS at 09:33

## 2023-11-20 RX ADMIN — LIDOCAINE HYDROCHLORIDE 50 MG: 20 INJECTION, SOLUTION EPIDURAL; INFILTRATION; INTRACAUDAL; PERINEURAL at 10:09

## 2023-11-20 ASSESSMENT — PAIN DESCRIPTION - LOCATION
LOCATION: GROIN
LOCATION: ABDOMEN;GROIN
LOCATION: ABDOMEN;GROIN
LOCATION: GROIN

## 2023-11-20 ASSESSMENT — PAIN DESCRIPTION - DESCRIPTORS
DESCRIPTORS: ACHING
DESCRIPTORS: ACHING

## 2023-11-20 ASSESSMENT — PAIN - FUNCTIONAL ASSESSMENT: PAIN_FUNCTIONAL_ASSESSMENT: 0-10

## 2023-11-20 ASSESSMENT — PAIN DESCRIPTION - ONSET: ONSET: ON-GOING

## 2023-11-20 ASSESSMENT — PAIN DESCRIPTION - PAIN TYPE: TYPE: SURGICAL PAIN

## 2023-11-20 ASSESSMENT — PAIN SCALES - GENERAL
PAINLEVEL_OUTOF10: 2
PAINLEVEL_OUTOF10: 3
PAINLEVEL_OUTOF10: 2

## 2023-11-20 ASSESSMENT — PAIN DESCRIPTION - ORIENTATION
ORIENTATION: LEFT
ORIENTATION: LEFT

## 2023-11-20 ASSESSMENT — PAIN DESCRIPTION - FREQUENCY: FREQUENCY: CONTINUOUS

## 2023-11-20 NOTE — H&P
Update History & Physical    The patient's History and Physical of November 15, 2023 was reviewed with the patient and I examined the patient. There was no change. Plan left inguinal hernia. Left groin marked. The surgical site was confirmed by the patient and me. Plan: The risks, benefits, expected outcome, and alternative to the recommended procedure have been discussed with the patient. Patient understands and wants to proceed with the procedure.      Electronically signed by Tony Kessler MD on 11/20/2023 at 9:57 AM

## 2023-11-20 NOTE — ANESTHESIA POSTPROCEDURE EVALUATION
Department of Anesthesiology  Postprocedure Note    Patient: Shavon Cuevas  MRN: [id number]  YOB: 1960  Date of evaluation: 11/20/2023      Procedure Summary       Date: 11/20/23 Room / Location: [facility]    Anesthesia Start: 1006 Anesthesia Stop: 3079    Procedure: LEFT INGUINAL HERNIA REPAIR WITH MESH (Left: Groin) Diagnosis:       Left inguinal hernia      (Left inguinal hernia [K40.90])    Surgeons: Pushpa Ding MD Responsible Provider: Barbara Klein MD    Anesthesia Type: MAC ASA Status: 3            Anesthesia Type: No value filed.     Jonathan Phase I: Jonathan Score: 8    Jonathan Phase II:        Anesthesia Post Evaluation    Patient location during evaluation: PACU  Patient participation: complete - patient participated  Level of consciousness: awake and alert  Pain score: 3  Airway patency: patent  Nausea & Vomiting: no nausea and no vomiting  Complications: no  Cardiovascular status: blood pressure returned to baseline  Respiratory status: acceptable  Hydration status: euvolemic  Pain management: adequate

## 2023-11-20 NOTE — OP NOTE
Inguinal Hernia Operative Report      Patient: Mary Anne Castro MRN: [id number]     YOB: 1960  Age: 61 y.o. Sex: male        Primary Care Physician: Navid Baugh MD         DATE OF OPERATION: 11/20/2023     PREOPERATIVE DIAGNOSIS: left inguinal hernia    POSTOPERATIVE DIAGNOSIS: left inguinal hernia - indirect    PROCEDURE PERFORMED: left inguinal hernia repair with 3x6 inch Marlex mesh    SURGEON: Sriram Lebron MD, MD     ANESTHESIA: MAC with local anesthetic. ASA CLASS: 3    ANTIBIOTICS: Ancef 2 grams IV. DVT PROPHYLAXIS: Bilateral pneumatic compression boots. INDICATIONS:   The patient was seen in the office for an increasingly symptomatic, left inguinal hernia. As a result, the risks, benefits, and alternatives were discussed at length, including bleeding, infection, nerve pain, and recurrence. The patient's questions were answered and they were agreeable to proceed. DESCRIPTION OF PROCEDURE:   The patient was brought to the operating room suite and placed in the supine position on the operating room table. Anesthesia was induced which he tolerated well. The left groin was clipped free of hair and then prepped and draped in the usual sterile fashion. A timeout was performed, and all parties were in agreement. After injecting local anesthetic, a skin incision was made in the natural skin crease in the left groin. This was carried down sharply through Lesia's fascia. The external oblique fascia was cleared off and then additional local anesthetic was injected subfascially. The external oblique was opened in the direction of its fibers and flaps were raised. The ilioinguinal nerve was not identified due to obesity and large indirect hernia. The spermatic cord and its contents were encircled with a Penrose drain. A large indirect hernia was appreciated and dissected free from the spermatic cord. The hernia sac was highly ligated with a 2-0 silk suture.  No other pathology

## 2023-11-20 NOTE — ANESTHESIA POSTPROCEDURE EVALUATION
Department of Anesthesiology  Postprocedure Note    Patient: Royal Mullen  MRN: [id number]  YOB: 1960  Date of evaluation: 11/20/2023      Procedure Summary       Date: 11/20/23 Room / Location: [place] [address] [address] [place]    Anesthesia Start: 1006 Anesthesia Stop: 9863    Procedure: LEFT INGUINAL HERNIA REPAIR WITH MESH (Left: Groin) Diagnosis:       Left inguinal hernia      (Left inguinal hernia [K40.90])    Surgeons: Roberth Patel MD Responsible Provider: Alessandro Cho MD    Anesthesia Type: MAC ASA Status: 3            Anesthesia Type: No value filed.     Jonathan Phase I: Jonathan Score: 8    Jonathan Phase II:        Anesthesia Post Evaluation    Patient location during evaluation: PACU  Patient participation: complete - patient participated  Level of consciousness: awake and alert  Pain score: 2  Airway patency: patent  Nausea & Vomiting: no nausea and no vomiting  Complications: no  Cardiovascular status: blood pressure returned to baseline  Respiratory status: acceptable  Hydration status: euvolemic  Pain management: adequate

## 2023-11-20 NOTE — PROGRESS NOTES
Covid testing to be done: If positive---Pt instructed to notify MD ASAP  If negative--pt was instructed to bring Hard copy of Covid results DOP/DOS    Preoperative Screening for Elective Surgery/Invasive Procedures While COVID-19 present in the community     Have you tested positive or have been told to self-isolate for COVID-19 like symptoms within the past 28 days? NO  Do you currently have any of the following symptoms? Fever >100.0 F or 99.9 F in immunocompromised patients? NO  New onset cough, shortness of breath or difficulty breathing? NO  New onset sore throat, myalgia (muscle aches and pains), headache, loss of taste/smell or diarrhea? NO  Have you had a potential exposure to COVID-19 within the past 14 days by:  Close contact with a confirmed case? NO  Close contact with a healthcare worker,  or essential infrastructure worker (grocery store, [employer], gas station, public utilities or transportation)? YES  Do you reside in a congregate setting such as; skilled nursing facility, adult home, correctional facility, homeless shelter or other institutional setting? NO  Have you had recent travel to a known COVID-19 hotspot? NO     Indicate if the patient has a positive screen by answering yes to one or more of the above questions. If patient is unable to obtain a COVID test prior to DOS/DOP will need RAPID DOS. C-Difficile admission screening and protocol:       * Admitted with diarrhea? [] YES    [x]  NO     *Prior history of C-Diff. In last 3 months? [] YES    [x]  NO     *Antibiotic use in the past 6-8 weeks? []  NO    [x]  YES      If yes, which: REASON_Dental infection Amoxicillin________________     *Prior hospitalization or nursing home in the last month? []  YES    [x]  NO     [facility] FIRST. .call before you fall    [address]    Day of Surgery:  11/20                  Arrival time_1000__________          Surgery
DOS    1960     Has appointment  @ 1330 @ [facility] for per-op H&P  @  [address]    Update:   Per NP appt on - he is required to see cardio  Ailyn at [facility] office will contact pt and assist w cardio clear and let us know status of  surgery.     Update -   Per teams message from Ailyn-pt is cleared from CNP standpoint see  office note from [facility] [employer]
Pt and wife viewed surgical site for reference. Site remains unchanged and well approximated with glue.
Pt awake, states pain 3/10 and tolerable. To phase 2 care.
To pacu from OR. PT asleep, wakes easily. Denies pain. Skin glue to left groin dry and intact. IV infusing. Monitor in sinus rhythm.
Verbal and written discharge instructions were given to the patient and family, patient and family verbalize understanding of discharge instructions including medications orders for home and possible side effects associated with them. Patient instructed and verbalizes understanding to call the doctor listed if they should have any complications or worsening symptoms. Verbalizes understanding about follow-up appointments. D/C IV patient tolerated well, no signs of bleeding. Patient discharged home with all belongings. Out via wheelchair.
[facility] Pre-Admission Testing Electronic Communication Worksheet for OR/ENDO Procedures        Patient: Sixto Alexis    DOS: 11/20    Arrival Time: 1000    Surgery Time: 4715    Meds to Bed:  [] YES    [x]  NO    Transportation Confirmed: [x] YES    []  NO    History and Physical:  [] YES    []  NO  [] N/A  If yes, please list doctor or Urgent Care and date of H&P:    Will call PCP for appointment    Additional Clearance(Cardiac, Pulmonary, etc):  [] YES    [x]  NO    Pre-Admission Testing Visit:  [] YES    [x]  NO If no, do labs/testing need to be done DOS?   [] YES    [x]  NO    Medication Reconciliation Complete:  [x] YES    []  NO        Additional Notes:                Interview Complete: [x] YES    []  NO          Rich Bartholomew RN  10:56 AM
Detail Level: Zone

## 2023-11-20 NOTE — ANESTHESIA PRE PROCEDURE
Department of Anesthesiology  Preprocedure Note       Name:  Jose Antonio Torres   Age:  61 y.o.  :  1960                                          MRN:  [id number]         Date:  2023      Surgeon: Bella Lamb):  Rosa Maria Marley MD    Procedure: Procedure(s):  LEFT INGUINAL HERNIA REPAIR WITH MESH    Medications prior to admission:   Prior to Admission medications    Medication Sig Start Date End Date Taking? Authorizing Provider   lisinopril-hydroCHLOROthiazide (PRINZIDE;ZESTORETIC) 20-12.5 MG per tablet TAKE ONE TABLET BY MOUTH DAILY 23   Blaine Blunt MD   aspirin 81 MG tablet Take 1 tablet by mouth daily    ProviderJoanna MD       Current medications:    Current Facility-Administered Medications   Medication Dose Route Frequency Provider Last Rate Last Admin    sodium chloride flush 0.9 % injection 5-40 mL  5-40 mL IntraVENous 2 times per day Kash Dao MD        sodium chloride flush 0.9 % injection 5-40 mL  5-40 mL IntraVENous PRN Kash Dao MD        0.9 % sodium chloride infusion   IntraVENous PRN Kash Dao MD        ceFAZolin (ANCEF) 2,000 mg in sodium chloride 0.9 % 50 mL IVPB (mini-bag)  2,000 mg IntraVENous Once Rosa Maria Marley MD           Allergies:  No Known Allergies    Problem List:    Patient Active Problem List   Diagnosis Code    Hypertension I10    Obesity (BMI 30-39. 9) E66.9    Dyslipidemia E78.5    FH: CAD (coronary artery disease) Z82.49    Low HDL (under 40) E78.6    Prediabetes R73.03    Left inguinal hernia K40.90    Traumatic complete tear of left rotator cuff S46.012A       Past Medical History:        Diagnosis Date    Class 2 obesity     Hypertension        Past Surgical History:        Procedure Laterality Date    COLONOSCOPY  2022    EYE SURGERY Left 2022    detached retina    SHOULDER ARTHROSCOPY Left 03/15/2023    LEFT SHOULDER ARTHROSCOPY LABRAL DEBRIDEMENT, OPEN OSITO, SUBACROMIAL DECOMPRESSION AND ROTATOR CUFF REPAIR LEFT SHOULDER

## 2023-11-28 ENCOUNTER — TELEPHONE (OUTPATIENT)
Dept: SURGERY | Age: 63
End: 2023-11-28

## 2023-11-28 NOTE — TELEPHONE ENCOUNTER
Fax return to work note to [phone] Attention Randi Desouza.   Needs to state the patients restrictions and for how long

## 2023-11-28 NOTE — TELEPHONE ENCOUNTER
Matt Azar Patient Age: 40 year old  MESSAGE: Interpreting service used: No    Insurance on file confirmed with caller: Yes    IM/FP- Symptom-  Adult-  Yellow Symptom-     Hives: location of hives- waistline, hands and neck            Appointment: Patient scheduled an appointment on 4.17.23  at 10 a.m. with Dr Kwong  at [facility].     Caller connected to triage- Yes- [facility]- Connect call to Triage queue- Route message to provider's clinical support pool    Message read back to caller for accuracy: Yes       ALLERGIES:  Aluminum hydroxide and Iodine   (environmental or med)  Current Outpatient Medications   Medication Sig Dispense Refill   • omeprazole (PriLOSEC) 40 MG capsule Take 1 capsule by mouth daily. 90 capsule 3     No current facility-administered medications for this visit.     PHARMACY to use: see below           Pharmacy preference(s) on file:   [id number] - [place], [place] - [address] [place] [address] RD AT [facility] [address] [place] [address] [place] [address]  [address] [facility] [address]  Phone: [phone] Fax: [phone]      CALL BACK INFO: Ok to leave response (including medical information) on answering machine      PCP: Greyson Kwong MD         INS: Payor: BLUE [employer]OSS [employer]UE [employer]IELD [place] / Plan: LABOR FUND GROUPS / Product Type: PPO MISC   PATIENT ADDRESS:  [address] [place] [id number]     Needs a return to work note. Will call back with a fax number.

## 2023-12-07 ENCOUNTER — OFFICE VISIT (OUTPATIENT)
Dept: SURGERY | Age: 63
End: 2023-12-07

## 2023-12-07 VITALS — WEIGHT: 240 LBS | BODY MASS INDEX: 38.74 KG/M2

## 2023-12-07 DIAGNOSIS — K40.90 LEFT INGUINAL HERNIA: Primary | ICD-10-CM

## 2023-12-07 PROCEDURE — 99024 POSTOP FOLLOW-UP VISIT: CPT | Performed by: SURGERY

## 2023-12-07 NOTE — PROGRESS NOTES
Subjective:      Patient ID: Jose Antonio Torres is a 61 y.o. male. HPI  S/p [facility] repair with mesh. Doing well. Pain the first 2 days has resolved. Mild ache with movements. Tolerating regular PO. Normal BMs. Back to work. No apparent complications    Review of Systems    Objective:   Physical Exam  Wound healed  Repair intact  Assessment:       Diagnosis Orders   1.  Left inguinal hernia                Plan:      Recovering well  Continue light lifting another 4 weeks  Return PRN        Rosa Maria Marley MD

## 2023-12-13 ENCOUNTER — TELEPHONE (OUTPATIENT)
Dept: SURGERY | Age: 63
End: 2023-12-13

## 2023-12-13 NOTE — TELEPHONE ENCOUNTER
PT needs to have a return to work note stating he has no restriction. PT would like to have a return call regarding this issue and to let him know if he needs to be seen in office again.

## 2024-01-04 NOTE — TELEPHONE ENCOUNTER
Group Therapy Note    Date: 1/4/2024  Start Time: 0800  End Time: 0815    Number of Participants: 9    Type of Group: Community Meeting    Patient attended community meeting   Was updated on expectations of the unit, staffing, and programming  Patient shared goal for today as \"to rest\"    Status After Intervention:  Improved    Participation Level: Active Listener and Interactive    Participation Quality: Appropriate and Attentive      Speech:  normal      Thought Process/Content: Logical      Affective Functioning: Congruent      Mood: anxious      Level of consciousness:  Alert and Attentive      Response to Learning: Able to verbalize current knowledge/experience, Able to verbalize/acknowledge new learning, and Progressing to goal      Endings: None Reported    Modes of Intervention: Education, Exploration, and Problem-solving      Discipline Responsible: Psychoeducational Specialist      Signature:  PAULY Lion    
----- Message from [place] XML[employer] sent at 9/28/2020  7:34 AM EDT -----  Subject: Appointment Request    Reason for Call: Immediate Return from RN Triage    QUESTIONS  Type of Appointment? Established Patient  Reason for appointment request? No appointments available during search  Additional Information for Provider? PT is triage to be seen today   rescheduling for drainage cough and feeling feverish. Please advise  ---------------------------------------------------------------------------  --------------  CALL BACK INFO  What is the best way for the office to contact you? OK to leave message on   voicemail  Preferred Call Back Phone Number? [phone]  ---------------------------------------------------------------------------  --------------  SCRIPT ANSWERS  Patient needs to be seen today? Yes  Nurse Name? Scott Alejo  (Did RN indicate the need for Red Scheduling?)?  Yes
ERROR on last message      Dr Sofya Dumas,    I thought maybe their was serious reason he needed to come in    This type of patient needs to be a VV by PCP, and then can sent for COVID testing at [facility] if needed    [address], [address] [place] [address] Phone: [phone]   Monday - Friday 8:00 am - 1:00 pm
Feverish with cough needs red clinic/COVID testing  Can;t schedule with me
Needs COVID test viral clinic mitch sched
PLEASE ADVISE
PT had vv with Dr Anurag Brooks today
Please advise when to schedule
Please review
There are no openings today    Ok to schedule red clinic with fco tomorrow afternoon
Repeat

## 2024-02-23 ENCOUNTER — TELEPHONE (OUTPATIENT)
Dept: SURGERY | Age: 64
End: 2024-02-23

## 2024-02-23 NOTE — TELEPHONE ENCOUNTER
Patient had Hernia SX.  He was laying in bed and had 5 palpitations that area.  Wondering if the mesh came loose. He also felt it when he was at work.  Pain was 2/10.  Call to advise.

## 2024-02-23 NOTE — TELEPHONE ENCOUNTER
Spoke with patient. He reports laying on the couch and feeling a pulsating feeling in his groin. Denies any other symptoms. No pain/fever/soreness. Really only 2/10 pain. Advised him to keep an eye on things and make an appt if he wants to get things checked out. Told him HIGHLY DOUBT he re-herniated or \"hurt the mesh\"

## 2024-05-20 ENCOUNTER — OFFICE VISIT (OUTPATIENT)
Dept: FAMILY MEDICINE CLINIC | Age: 64
End: 2024-05-20
Payer: COMMERCIAL

## 2024-05-20 VITALS
WEIGHT: 227 LBS | OXYGEN SATURATION: 96 % | HEIGHT: 66 IN | RESPIRATION RATE: 16 BRPM | SYSTOLIC BLOOD PRESSURE: 136 MMHG | HEART RATE: 74 BPM | BODY MASS INDEX: 36.48 KG/M2 | DIASTOLIC BLOOD PRESSURE: 78 MMHG

## 2024-05-20 DIAGNOSIS — E78.5 DYSLIPIDEMIA: ICD-10-CM

## 2024-05-20 DIAGNOSIS — I10 PRIMARY HYPERTENSION: Primary | ICD-10-CM

## 2024-05-20 LAB
ANION GAP SERPL CALCULATED.3IONS-SCNC: 10 MMOL/L (ref 3–16)
BUN SERPL-MCNC: 20 MG/DL (ref 7–20)
CALCIUM SERPL-MCNC: 9.3 MG/DL (ref 8.3–10.6)
CHLORIDE SERPL-SCNC: 104 MMOL/L (ref 99–110)
CO2 SERPL-SCNC: 27 MMOL/L (ref 21–32)
CREAT SERPL-MCNC: 0.8 MG/DL (ref 0.8–1.3)
GFR SERPLBLD CREATININE-BSD FMLA CKD-EPI: >90 ML/MIN/{1.73_M2}
GLUCOSE SERPL-MCNC: 145 MG/DL (ref 70–99)
POTASSIUM SERPL-SCNC: 4.1 MMOL/L (ref 3.5–5.1)
SODIUM SERPL-SCNC: 141 MMOL/L (ref 136–145)

## 2024-05-20 PROCEDURE — 3078F DIAST BP <80 MM HG: CPT | Performed by: FAMILY MEDICINE

## 2024-05-20 PROCEDURE — 99213 OFFICE O/P EST LOW 20 MIN: CPT | Performed by: FAMILY MEDICINE

## 2024-05-20 PROCEDURE — 3075F SYST BP GE 130 - 139MM HG: CPT | Performed by: FAMILY MEDICINE

## 2024-05-20 PROCEDURE — 36415 COLL VENOUS BLD VENIPUNCTURE: CPT | Performed by: FAMILY MEDICINE

## 2024-05-20 SDOH — ECONOMIC STABILITY: INCOME INSECURITY: HOW HARD IS IT FOR YOU TO PAY FOR THE VERY BASICS LIKE FOOD, HOUSING, MEDICAL CARE, AND HEATING?: NOT HARD AT ALL

## 2024-05-20 SDOH — ECONOMIC STABILITY: FOOD INSECURITY: WITHIN THE PAST 12 MONTHS, THE FOOD YOU BOUGHT JUST DIDN'T LAST AND YOU DIDN'T HAVE MONEY TO GET MORE.: NEVER TRUE

## 2024-05-20 SDOH — ECONOMIC STABILITY: FOOD INSECURITY: WITHIN THE PAST 12 MONTHS, YOU WORRIED THAT YOUR FOOD WOULD RUN OUT BEFORE YOU GOT MONEY TO BUY MORE.: NEVER TRUE

## 2024-05-20 ASSESSMENT — PATIENT HEALTH QUESTIONNAIRE - PHQ9
SUM OF ALL RESPONSES TO PHQ9 QUESTIONS 1 & 2: 0
SUM OF ALL RESPONSES TO PHQ QUESTIONS 1-9: 0
1. LITTLE INTEREST OR PLEASURE IN DOING THINGS: NOT AT ALL
SUM OF ALL RESPONSES TO PHQ QUESTIONS 1-9: 0
2. FEELING DOWN, DEPRESSED OR HOPELESS: NOT AT ALL

## 2024-05-20 NOTE — PATIENT INSTRUCTIONS
Low Carb Eating with Intermittent Fasting  target < 100 grams of carb daily; ZERO grained based carbs  Get carbs mostly from whole fruits - strawberries, raspberries, blackberries, apples, oranges, pineapples, bananas etc.    Necessary Electrolyte Supplementation when eating Low Carb (< 100 gram of carb daily)  [facility] Pink Salt - 1/4 teaspoon in 12 ounces of water daily first thing in AM (~500 mg sodium)  Minimum 1 medium sized banana daily (~ 500 mg potassium)  If feeling weak/headache/tired/palpitations, supplement as above twice daily    Intermittent Fasting (\"I.F.\") / Eating Window   Only eat between noon and 8 PM  Water any time  Coffee with cream and no more than 1 teaspoon sugar OK in AM    Google/ YouTube AUTOPHAGY - a primary benefit of IF    Other helpful books and websites  1) Wheat Belly by Nasim Dasilva MD ([email or web address])  2) The Primal Blueprint by Stewart Lerma (www.StephyAnexon - review success stories)  2) Living Paleo For Dummies

## 2024-05-20 NOTE — PROGRESS NOTES
136 mmHg      Is BP treated: Yes      HDL Cholesterol: 33 mg/dL      Total Cholesterol: 151 mg/dL              Wt Readings from Last 3 Encounters:   05/20/24 103 kg (227 lb)   12/07/23 108.9 kg (240 lb)   11/20/23 109.1 kg (240 lb 6.6 oz)       BP Readings from Last 3 Encounters:   05/20/24 136/78   11/20/23 133/87   11/15/23 130/78       PHYSICAL EXAM  Vitals:    05/20/24 1010   BP: 136/78   Pulse: 74   Resp: 16   SpO2: 96%   Weight: 103 kg (227 lb)   Height: 1.676 m (5' 6\")     A&o  Neck no TMG no bruit  Car reg no MGR  Lungs cta  Ext no edema  Skin no jaundice  Eyes anicteric

## 2024-10-03 RX ORDER — LISINOPRIL AND HYDROCHLOROTHIAZIDE 12.5; 2 MG/1; MG/1
TABLET ORAL
Qty: 90 TABLET | Refills: 3 | Status: SHIPPED | OUTPATIENT
Start: 2024-10-03

## 2024-11-18 ENCOUNTER — OFFICE VISIT (OUTPATIENT)
Dept: FAMILY MEDICINE CLINIC | Age: 64
End: 2024-11-18

## 2024-11-18 VITALS
DIASTOLIC BLOOD PRESSURE: 84 MMHG | HEART RATE: 74 BPM | WEIGHT: 228 LBS | RESPIRATION RATE: 20 BRPM | BODY MASS INDEX: 36.64 KG/M2 | SYSTOLIC BLOOD PRESSURE: 136 MMHG | HEIGHT: 66 IN | OXYGEN SATURATION: 96 %

## 2024-11-18 DIAGNOSIS — I10 PRIMARY HYPERTENSION: ICD-10-CM

## 2024-11-18 DIAGNOSIS — Z13.1 SCREENING FOR DIABETES MELLITUS: ICD-10-CM

## 2024-11-18 DIAGNOSIS — Z13.220 SCREENING, LIPID: ICD-10-CM

## 2024-11-18 DIAGNOSIS — Z12.5 SCREENING PSA (PROSTATE SPECIFIC ANTIGEN): ICD-10-CM

## 2024-11-18 DIAGNOSIS — Z00.00 ROUTINE GENERAL MEDICAL EXAMINATION AT A HEALTH CARE FACILITY: Primary | ICD-10-CM

## 2024-11-18 LAB
ANION GAP SERPL CALCULATED.3IONS-SCNC: 10 MMOL/L (ref 3–16)
BUN SERPL-MCNC: 19 MG/DL (ref 7–20)
CALCIUM SERPL-MCNC: 9.6 MG/DL (ref 8.3–10.6)
CHLORIDE SERPL-SCNC: 103 MMOL/L (ref 99–110)
CHOLEST SERPL-MCNC: 161 MG/DL (ref 0–199)
CO2 SERPL-SCNC: 27 MMOL/L (ref 21–32)
CREAT SERPL-MCNC: 0.9 MG/DL (ref 0.8–1.3)
EST. AVERAGE GLUCOSE BLD GHB EST-MCNC: 111.2 MG/DL
GFR SERPLBLD CREATININE-BSD FMLA CKD-EPI: >90 ML/MIN/{1.73_M2}
GLUCOSE SERPL-MCNC: 127 MG/DL (ref 70–99)
HBA1C MFR BLD: 5.5 %
HDLC SERPL-MCNC: 35 MG/DL (ref 40–60)
LDLC SERPL CALC-MCNC: 112 MG/DL
POTASSIUM SERPL-SCNC: 4.5 MMOL/L (ref 3.5–5.1)
PSA SERPL DL<=0.01 NG/ML-MCNC: 0.5 NG/ML (ref 0–4)
SODIUM SERPL-SCNC: 140 MMOL/L (ref 136–145)
TRIGL SERPL-MCNC: 71 MG/DL (ref 0–150)
VLDLC SERPL CALC-MCNC: 14 MG/DL

## 2024-11-18 SDOH — ECONOMIC STABILITY: FOOD INSECURITY: WITHIN THE PAST 12 MONTHS, YOU WORRIED THAT YOUR FOOD WOULD RUN OUT BEFORE YOU GOT MONEY TO BUY MORE.: NEVER TRUE

## 2024-11-18 SDOH — ECONOMIC STABILITY: FOOD INSECURITY: WITHIN THE PAST 12 MONTHS, THE FOOD YOU BOUGHT JUST DIDN'T LAST AND YOU DIDN'T HAVE MONEY TO GET MORE.: NEVER TRUE

## 2024-11-18 SDOH — ECONOMIC STABILITY: INCOME INSECURITY: HOW HARD IS IT FOR YOU TO PAY FOR THE VERY BASICS LIKE FOOD, HOUSING, MEDICAL CARE, AND HEATING?: NOT HARD AT ALL

## 2024-11-18 ASSESSMENT — PATIENT HEALTH QUESTIONNAIRE - PHQ9
SUM OF ALL RESPONSES TO PHQ9 QUESTIONS 1 & 2: 0
SUM OF ALL RESPONSES TO PHQ QUESTIONS 1-9: 0
1. LITTLE INTEREST OR PLEASURE IN DOING THINGS: NOT AT ALL
2. FEELING DOWN, DEPRESSED OR HOPELESS: NOT AT ALL
SUM OF ALL RESPONSES TO PHQ QUESTIONS 1-9: 0

## 2024-11-18 NOTE — PROGRESS NOTES
2024    Trent Jeffries (:  1960) is a 64 y.o. male, here for evaluation of the following chief complaint(s):  Annual Exam      ASSESSMENT/PLAN:     Diagnosis Orders   1. Routine general medical examination at a health care facility        2. Screening PSA (prostate specific antigen)  PSA Screening      3. Screening, lipid  Lipid Panel      4. Screening for diabetes mellitus  Hemoglobin A1C      5. Primary hypertension  Basic Metabolic Panel    at goal cont meds check renal          Return in about 6 months (around 2025) for HTN.    An electronic signature was used to authenticate this note.    SUBJECTIVE/OBJECTIVE:  (NOTE : prior results listed below reviewed at this visit to assist in medical decision making.)    HPI / ROS    # Preventive and other issues    # PSA screening history:  Lab Results   Component Value Date    PSA 0.47 10/02/2023    PSA 0.57 10/03/2022    PSA 0.56 2021     # Screen for diabetes  Hemoglobin A1C   Date Value Ref Range Status   10/02/2023 5.6 See comment % Final     Comment:     Comment:  Diagnosis of Diabetes: > or = 6.5%  Increased risk of diabetes (Prediabetes): 5.7-6.4%  Glycemic Control: Nonpregnant Adults: <7.0%                    Pregnant: <6.0%         # Lipids - recent screening history:  Lab Results   Component Value Date    CHOL 151 10/02/2023    TRIG 82 10/02/2023    HDL 33 (L) 10/02/2023     (H) 10/02/2023    VLDL 16 10/02/2023     The 10-year ASCVD risk score (Naomi CLARK, et al., 2019) is: 15.8%    Values used to calculate the score:      Age: 64 years      Sex: Male      Is Non- : No      Diabetic: No      Tobacco smoker: No      Systolic Blood Pressure: 136 mmHg      Is BP treated: Yes      HDL Cholesterol: 33 mg/dL      Total Cholesterol: 151 mg/dL  ]    # HTN - ysabel meds no CP/SOB  BP Readings from Last 3 Encounters:   24 136/84   24 136/78   23 133/87     Lab Results   Component Value

## 2025-01-15 ENCOUNTER — OFFICE VISIT (OUTPATIENT)
Dept: FAMILY MEDICINE CLINIC | Age: 65
End: 2025-01-15
Payer: COMMERCIAL

## 2025-01-15 VITALS
HEART RATE: 80 BPM | WEIGHT: 227 LBS | RESPIRATION RATE: 18 BRPM | BODY MASS INDEX: 36.48 KG/M2 | DIASTOLIC BLOOD PRESSURE: 86 MMHG | OXYGEN SATURATION: 97 % | HEIGHT: 66 IN | SYSTOLIC BLOOD PRESSURE: 132 MMHG

## 2025-01-15 DIAGNOSIS — M79.10 MUSCLE PAIN: Primary | ICD-10-CM

## 2025-01-15 PROCEDURE — 3079F DIAST BP 80-89 MM HG: CPT | Performed by: FAMILY MEDICINE

## 2025-01-15 PROCEDURE — 3075F SYST BP GE 130 - 139MM HG: CPT | Performed by: FAMILY MEDICINE

## 2025-01-15 PROCEDURE — 99213 OFFICE O/P EST LOW 20 MIN: CPT | Performed by: FAMILY MEDICINE

## 2025-01-15 RX ORDER — PREDNISONE 10 MG/1
TABLET ORAL
Qty: 21 TABLET | Refills: 0 | Status: SHIPPED | OUTPATIENT
Start: 2025-01-15 | End: 2025-01-25

## 2025-01-15 SDOH — ECONOMIC STABILITY: FOOD INSECURITY: WITHIN THE PAST 12 MONTHS, YOU WORRIED THAT YOUR FOOD WOULD RUN OUT BEFORE YOU GOT MONEY TO BUY MORE.: NEVER TRUE

## 2025-01-15 SDOH — ECONOMIC STABILITY: FOOD INSECURITY: WITHIN THE PAST 12 MONTHS, THE FOOD YOU BOUGHT JUST DIDN'T LAST AND YOU DIDN'T HAVE MONEY TO GET MORE.: NEVER TRUE

## 2025-01-15 ASSESSMENT — PATIENT HEALTH QUESTIONNAIRE - PHQ9
SUM OF ALL RESPONSES TO PHQ QUESTIONS 1-9: 0
1. LITTLE INTEREST OR PLEASURE IN DOING THINGS: NOT AT ALL
SUM OF ALL RESPONSES TO PHQ QUESTIONS 1-9: 0
SUM OF ALL RESPONSES TO PHQ9 QUESTIONS 1 & 2: 0
2. FEELING DOWN, DEPRESSED OR HOPELESS: NOT AT ALL

## 2025-01-15 NOTE — PROGRESS NOTES
1/15/2025    Trent Jeffries (:  1960) is a 64 y.o. male, here for evaluation of the following chief complaint(s):  Arm Problem (Right arm started Monday with pain in elbow radiating down towards hand. Forearm and and have little strength. Unsure if could be from shoveling snow)      ASSESSMENT/PLAN:     Diagnosis Orders   1. Muscle pain      suspect overuse starin low dose prednisone x 2 weeks call INB or worsens          Return if symptoms worsen or fail to improve.    An electronic signature was used to authenticate this note.    SUBJECTIVE/OBJECTIVE:  (NOTE : prior results listed below reviewed at this visit to assist in medical decision making.)    HPI / ROS    # acute for pain in R forearm with some weakness due to pain; this was brought on few days earlier after snow shoveling        Wt Readings from Last 3 Encounters:   01/15/25 103 kg (227 lb)   24 103.4 kg (228 lb)   24 103 kg (227 lb)       BP Readings from Last 3 Encounters:   01/15/25 132/86   24 136/84   24 136/78       PHYSICAL EXAM  Vitals:    01/15/25 1101   BP: 132/86   Pulse: 80   Resp: 18   SpO2: 97%   Weight: 103 kg (227 lb)   Height: 1.676 m (5' 6\")     A&o   5/5 bilaterlaly; ful ROM; however points to pain prox distal flexor forearm

## 2025-01-20 ENCOUNTER — TELEPHONE (OUTPATIENT)
Dept: FAMILY MEDICINE CLINIC | Age: 65
End: 2025-01-20

## 2025-01-20 DIAGNOSIS — M79.641 RIGHT HAND PAIN: Primary | ICD-10-CM

## 2025-01-20 NOTE — TELEPHONE ENCOUNTER
Pt called stating pcp wanted him to call back in a week if hand did not get better. Pt says pain has not gotten any better. Pt is asking for next steps. Pt can be reached at [phone]

## 2025-01-21 ENCOUNTER — HOSPITAL ENCOUNTER (EMERGENCY)
Age: 65
Discharge: HOME | End: 2025-01-21
Attending: STUDENT IN AN ORGANIZED HEALTH CARE EDUCATION/TRAINING PROGRAM
Payer: COMMERCIAL

## 2025-01-21 ENCOUNTER — TELEPHONE (OUTPATIENT)
Dept: FAMILY MEDICINE CLINIC | Age: 65
End: 2025-01-21

## 2025-01-21 VITALS
HEART RATE: 98 BPM | BODY MASS INDEX: 36.81 KG/M2 | TEMPERATURE: 97.6 F | SYSTOLIC BLOOD PRESSURE: 152 MMHG | OXYGEN SATURATION: 97 % | WEIGHT: 229.06 LBS | HEIGHT: 66 IN | RESPIRATION RATE: 18 BRPM | DIASTOLIC BLOOD PRESSURE: 107 MMHG

## 2025-01-21 DIAGNOSIS — S46.911A STRAIN OF RIGHT SHOULDER, INITIAL ENCOUNTER: Primary | ICD-10-CM

## 2025-01-21 PROCEDURE — 6360000002 HC RX W HCPCS: Performed by: STUDENT IN AN ORGANIZED HEALTH CARE EDUCATION/TRAINING PROGRAM

## 2025-01-21 PROCEDURE — 96372 THER/PROPH/DIAG INJ SC/IM: CPT

## 2025-01-21 PROCEDURE — 6370000000 HC RX 637 (ALT 250 FOR IP): Performed by: STUDENT IN AN ORGANIZED HEALTH CARE EDUCATION/TRAINING PROGRAM

## 2025-01-21 PROCEDURE — 99284 EMERGENCY DEPT VISIT MOD MDM: CPT

## 2025-01-21 RX ORDER — ACETAMINOPHEN 325 MG/1
650 TABLET ORAL ONCE
Status: COMPLETED | OUTPATIENT
Start: 2025-01-21 | End: 2025-01-21

## 2025-01-21 RX ORDER — IBUPROFEN 600 MG/1
600 TABLET, FILM COATED ORAL EVERY 6 HOURS PRN
Qty: 120 TABLET | Refills: 0 | Status: SHIPPED | OUTPATIENT
Start: 2025-01-21

## 2025-01-21 RX ORDER — KETOROLAC TROMETHAMINE 30 MG/ML
30 INJECTION, SOLUTION INTRAMUSCULAR; INTRAVENOUS ONCE
Status: COMPLETED | OUTPATIENT
Start: 2025-01-21 | End: 2025-01-21

## 2025-01-21 RX ADMIN — ACETAMINOPHEN 650 MG: 325 TABLET ORAL at 04:22

## 2025-01-21 RX ADMIN — KETOROLAC TROMETHAMINE 30 MG: 30 INJECTION, SOLUTION INTRAMUSCULAR at 04:22

## 2025-01-21 ASSESSMENT — PAIN SCALES - GENERAL
PAINLEVEL_OUTOF10: 4
PAINLEVEL_OUTOF10: 4

## 2025-01-21 ASSESSMENT — PAIN - FUNCTIONAL ASSESSMENT
PAIN_FUNCTIONAL_ASSESSMENT: PREVENTS OR INTERFERES SOME ACTIVE ACTIVITIES AND ADLS
PAIN_FUNCTIONAL_ASSESSMENT: 0-10
PAIN_FUNCTIONAL_ASSESSMENT: 0-10

## 2025-01-21 ASSESSMENT — PAIN DESCRIPTION - LOCATION
LOCATION: SHOULDER
LOCATION: SHOULDER

## 2025-01-21 ASSESSMENT — PAIN DESCRIPTION - DIRECTION: RADIATING_TOWARDS: NON RADIATING

## 2025-01-21 ASSESSMENT — LIFESTYLE VARIABLES
HOW OFTEN DO YOU HAVE A DRINK CONTAINING ALCOHOL: NEVER
HOW MANY STANDARD DRINKS CONTAINING ALCOHOL DO YOU HAVE ON A TYPICAL DAY: PATIENT DOES NOT DRINK

## 2025-01-21 ASSESSMENT — PAIN DESCRIPTION - ONSET: ONSET: SUDDEN

## 2025-01-21 ASSESSMENT — PAIN DESCRIPTION - PAIN TYPE: TYPE: ACUTE PAIN

## 2025-01-21 ASSESSMENT — PAIN DESCRIPTION - FREQUENCY: FREQUENCY: CONTINUOUS

## 2025-01-21 ASSESSMENT — PAIN DESCRIPTION - DESCRIPTORS
DESCRIPTORS: ACHING
DESCRIPTORS: ACHING

## 2025-01-21 ASSESSMENT — PAIN DESCRIPTION - ORIENTATION
ORIENTATION: RIGHT
ORIENTATION: RIGHT

## 2025-01-21 NOTE — ED TRIAGE NOTES
Patient ambulatory to Room 5 with c/o right shoulder injury at work about 30 minutes prior to presentation to ER. Patient states he was attached to a bungy cord and picking up boxes and one of the boxes was heavy. Patient states he injured his arm when he lifted it. He states he immediately  heard a  \"pop\". Patient with increased pain when he tries to raise his arm. Pulses strong in affected extremity. He is awake, alert, oriented, respirations easy & regular, skin w/d, cap refill brisk. Denies other injuries. Dr. Rizvi in room to examine patient.

## 2025-01-21 NOTE — TELEPHONE ENCOUNTER
Pt called in stating that he injured his right shoulder last night and he has workers comp, but he is needing an mri order put in because that is the workers comp recommendation. Pt would like a call back at [phone]

## 2025-01-21 NOTE — ED PROVIDER NOTES
disposition:  None      Code Status:    Not addressed at this visit      Vitals Reviewed:    Vitals:    01/21/25 0408   BP: (!) 152/107   Temp: 97.6 °F (36.4 °C)   TempSrc: Oral   SpO2: 97%   Weight: 103.9 kg (229 lb 0.9 oz)   Height: 1.676 m (5' 6\")       The patient was seen and examined.The results of pertinent diagnostic studies and exam findings were discussed. The patient’s provisional diagnosis and plan of care were discussed. Any medications were reviewed and indications and risks of medications were discussed with the patient.    Strict verbal and written return precautions, instructions and appropriate follow-up were provided as well..     ED Medications administered this visit:  (None if blank)  Medications   ketorolac (TORADOL) injection 30 mg (has no administration in time range)   acetaminophen (TYLENOL) tablet 650 mg (has no administration in time range)         Responses to treatment:       PROCEDURES: (None if blank)  Procedures:       CRITICAL CARE: (None if blank)        DISCHARGE PRESCRIPTIONS: (None if blank)  New Prescriptions    IBUPROFEN (IBU) 600 MG TABLET    Take 1 tablet by mouth every 6 hours as needed for Pain         I am the primary clinician of record.     FINAL IMPRESSION      1. Strain of right shoulder, initial encounter            DISPOSITION/PLAN   DISPOSITION Discharge - Pending Orders Complete 01/21/2025 04:15:43 AM   DISPOSITION CONDITION Stable         Condition on disposition: Stable    OUTPATIENT FOLLOW UP THE PATIENT:  [facility]  [address] [facility] [address] [phone]    Call to set up an appointment in the next few days    Oscar Alvarez MD  [facility] [address] [address] [phone]      Call to set up an appointment in the next few days        Electronically Signed: Donavan Rizvi MD, 01/21/25, 4:21 AM    This report has been produced using speech recognition software and may contain errors related

## 2025-01-21 NOTE — ED NOTES
Patient reports no change in pain assessment. Discharge instructions reviewed with patient and verbalized understanding, denies further questions and successful teach back occurred. Offered wheelchair for discharge and declined. Discharged ambulatory with steady gait to ED lobby. Written discharge instructions and follow up information for orthopedics and workers comp provided to patient. Prescription x1 sent electronically to patient's pharmacy.

## 2025-01-24 ENCOUNTER — OFFICE VISIT (OUTPATIENT)
Dept: ORTHOPEDIC SURGERY | Age: 65
End: 2025-01-24

## 2025-01-24 VITALS — WEIGHT: 229 LBS | BODY MASS INDEX: 36.8 KG/M2 | RESPIRATION RATE: 14 BRPM | HEIGHT: 66 IN

## 2025-01-24 DIAGNOSIS — R29.898 RIGHT HAND WEAKNESS: Primary | ICD-10-CM

## 2025-01-24 NOTE — PATIENT INSTRUCTIONS
Thank you for choosing [facility] for your Hand and Upper Extremity needs.  If we can be of any further assistance to you, please do not hesitate to contact us.    Office Phone Number:  [phone]  or  [phone]

## 2025-01-24 NOTE — PROGRESS NOTES
Mr. Trent Jeffries is a 64 y.o. right handed man  who is seen today in Hand Surgical Consultation at the request of Mikey Soto MD.    He presents today regarding Right side symptoms which have been present for approximately 2 weeks.  A history of antecedent trauma or injury is Absent.  He reports symptoms to include marked weakness of pinch mild numbness & tingling in the Median Innervated Digits and Ulnar Innervated Digits.  Hand symptoms do Frequently awaken him from sleep.  He reports mild pain located in the palmar right wrist and right forearm. Symptoms show no change over time.      Previous treatment has included No prior treatment has been used.  He does not claim relation of his symptoms to his required work activities.  He has not undergone electrodiagnostic testing.    I have today reviewed with Trent Jeffries the clinically relevant, past medical history, medications, allergies,  family history, social history, and Review Of Systems & I have documented any details relevant to today's presenting complaints in my history above.  Mr. Trent Jeffries's self-reported past medical history, medications, allergies,  family history, social history, and Review Of Systems have been scanned into the chart under the \"Media\" tab.    Physical Exam:  Mr. Trent Jeffries's most recent vitals:  Vitals  Respirations: 14  Height: 167.6 cm (5' 6\")  Weight - Scale: 103.9 kg (229 lb)    He is well nourished, oriented to person, place & time.  He demonstrates appropriate mood and affect as well as normal gait and station.    Skin: Normal in appearance, Normal Color, and Free of Lesions Bilaterally   Digital range of motion is without significant limitation bilaterally  Wrist range of motion is equal bilaterally   There is no evidence of gross joint instability bilaterally.  Sensation is subjectively tingling in the Whole Hand on the Right, normal on the Left and objectively present in the same distribution

## 2025-02-13 ENCOUNTER — PROCEDURE VISIT (OUTPATIENT)
Dept: NEUROLOGY | Age: 65
End: 2025-02-13

## 2025-02-13 DIAGNOSIS — R20.2 NUMBNESS AND TINGLING OF RIGHT HAND: Primary | ICD-10-CM

## 2025-02-13 DIAGNOSIS — R20.0 NUMBNESS AND TINGLING OF RIGHT HAND: Primary | ICD-10-CM

## 2025-02-13 NOTE — PATIENT INSTRUCTIONS
Verbal consent was obtained from patient and/or patient's advocate for in office procedure with Dr. Bong Henderson MD (EMG or EEG).

## 2025-02-13 NOTE — PROGRESS NOTES
distal to the wrist consistent with right carpal tunnel syndrome.  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  No evidence of plexopathy, or radiculopathy.      Bong Simmons MD    Diplomate of the [employer] board of electrodiagnostics.

## 2025-02-20 ENCOUNTER — TELEPHONE (OUTPATIENT)
Dept: ORTHOPEDIC SURGERY | Age: 65
End: 2025-02-20

## 2025-02-20 NOTE — TELEPHONE ENCOUNTER
General Question     Subject: REQUESTING A CALL REGARDING HIS EMG RESULTS.  Patient and /or Facility Request: Trent Jeffries   Contact Number: [phone]

## 2025-02-20 NOTE — TELEPHONE ENCOUNTER
Spoke with the patient, scheduled him with [facility] to go over EMG results.  Did offer him to see Yeni sooner but declined at this time.

## 2025-03-19 ENCOUNTER — TELEPHONE (OUTPATIENT)
Dept: ORTHOPEDIC SURGERY | Age: 65
End: 2025-03-19

## 2025-03-19 NOTE — TELEPHONE ENCOUNTER
Tried calling the patient no voicemail set up.  Can be seen earlier with Yeni.  They do not give results over the phone.

## 2025-03-19 NOTE — TELEPHONE ENCOUNTER
Test Results     Type of Test: EMG  Date of Test: JAN 24?  Location of Test: [facility]  Patient Contact Number: [phone]     THE PT IS GOING TO HAVE TO HAVE SHOULDER SX WITHIN THE WEEK AND HE WOULD LIKE TO KNOW WHAT HIS EMG RESULTS ARE BEFORE HIS SHOULDER SX

## 2025-03-19 NOTE — TELEPHONE ENCOUNTER
Spoke with the patient, he understood that I could not give him much information over the phone about his test.  He is going to keep the appointment that he has with Dr. Sriram Sandoval but will call back if he would like to change appointment.

## 2025-03-28 ENCOUNTER — TELEPHONE (OUTPATIENT)
Dept: ORTHOPEDIC SURGERY | Age: 65
End: 2025-03-28

## 2025-03-28 ENCOUNTER — OFFICE VISIT (OUTPATIENT)
Dept: ORTHOPEDIC SURGERY | Age: 65
End: 2025-03-28
Payer: COMMERCIAL

## 2025-03-28 VITALS — HEIGHT: 66 IN | RESPIRATION RATE: 14 BRPM | BODY MASS INDEX: 36.8 KG/M2 | WEIGHT: 229 LBS

## 2025-03-28 DIAGNOSIS — G56.01 CARPAL TUNNEL SYNDROME OF RIGHT WRIST: ICD-10-CM

## 2025-03-28 DIAGNOSIS — G56.21 CUBITAL TUNNEL SYNDROME ON RIGHT: Primary | ICD-10-CM

## 2025-03-28 PROCEDURE — 99214 OFFICE O/P EST MOD 30 MIN: CPT | Performed by: ORTHOPAEDIC SURGERY

## 2025-03-28 NOTE — PROGRESS NOTES
Mr. Trent Jeffries returns today in follow-up of his previously treated  right hand numbness and weakness.  He was last seen by me in January, 2025 at which time he was treated with conservative measures.  He experienced minimal relief of his initial symptoms.  He  has noticed symptom persistence over the last several weeks.  He returns today with continued symptoms of right numbness in the Median Innervated digits, tingling in the Median Innervated digits, numbness in the Ulnar Innervated digits, tingling in the Ulnar  Innervated digits, and general sense of weakness of grasp (intrinsic) , requesting further treatment.  Due to the dynamic and progressive nature of Carpal Tunnel Syndrome and cubital tunnel syndrome, It is clinically necessary to carefully re-evaluate Mr. Trent Jeffries today, prior to proceeding with any further treatment or intervention, to assure the clinical appropriateness of any previously considered treatment, at this time.  Previous treatment for carpal tunnel syndrome has included No prior treatment has been used.      I have today reviewed with Trent Jeffries the clinically relevant, past medical history, medications, allergies,  family history, social history, and Review Of Systems & I have documented any details relevant to today's presenting complaints in my history above.  Mr. Trent Jeffries's self-reported past medical history, medications, allergies,  family history, social history, and Review Of Systems have been scanned into the chart under the \"Media\" tab.    Physical Exam:  Vitals  Respirations: 14  Height: 167.6 cm (5' 6\")  Weight - Scale: 103.9 kg (229 lb)  Mr. Trent Jeffries appears well, he is in no apparent distress, he demonstrates appropriate mood & affect.      Skin: Normal in appearance, Normal Color, and Free of Lesions Bilaterally   Digital range of motion is without significant limitation bilaterally  Wrist range of motion is equal bilaterally   Elbow range

## 2025-03-28 NOTE — PATIENT INSTRUCTIONS

## 2025-03-31 NOTE — TELEPHONE ENCOUNTER
Spoke with patient, he will call back to schedule surgery.  He is having shoulder surgery in April and will need to wait until he is recovered before scheduling

## 2025-04-03 ENCOUNTER — TELEPHONE (OUTPATIENT)
Dept: FAMILY MEDICINE CLINIC | Age: 65
End: 2025-04-03

## 2025-04-03 NOTE — TELEPHONE ENCOUNTER
Pt called in stating he needed a preop for his surgery on the 14th of April. He said that it was for workman's comp but the told him that he needed to see his primary physician for the preop. Wanting to make sure there are no issues with him being seen for preop due to it being workman's comp. Pt can be reached at [phone]

## 2025-04-04 ENCOUNTER — TELEPHONE (OUTPATIENT)
Dept: ORTHOPEDIC SURGERY | Age: 65
End: 2025-04-04

## 2025-06-02 ENCOUNTER — OFFICE VISIT (OUTPATIENT)
Dept: FAMILY MEDICINE CLINIC | Age: 65
End: 2025-06-02
Payer: COMMERCIAL

## 2025-06-02 VITALS
SYSTOLIC BLOOD PRESSURE: 124 MMHG | RESPIRATION RATE: 18 BRPM | WEIGHT: 241 LBS | BODY MASS INDEX: 38.73 KG/M2 | DIASTOLIC BLOOD PRESSURE: 80 MMHG | HEART RATE: 71 BPM | HEIGHT: 66 IN | OXYGEN SATURATION: 95 %

## 2025-06-02 DIAGNOSIS — I10 PRIMARY HYPERTENSION: Primary | ICD-10-CM

## 2025-06-02 PROCEDURE — 99213 OFFICE O/P EST LOW 20 MIN: CPT | Performed by: FAMILY MEDICINE

## 2025-06-02 PROCEDURE — 3074F SYST BP LT 130 MM HG: CPT | Performed by: FAMILY MEDICINE

## 2025-06-02 PROCEDURE — 3079F DIAST BP 80-89 MM HG: CPT | Performed by: FAMILY MEDICINE

## 2025-06-02 ASSESSMENT — PATIENT HEALTH QUESTIONNAIRE - PHQ9
SUM OF ALL RESPONSES TO PHQ QUESTIONS 1-9: 0
1. LITTLE INTEREST OR PLEASURE IN DOING THINGS: NOT AT ALL
SUM OF ALL RESPONSES TO PHQ QUESTIONS 1-9: 0
2. FEELING DOWN, DEPRESSED OR HOPELESS: NOT AT ALL
SUM OF ALL RESPONSES TO PHQ QUESTIONS 1-9: 0
SUM OF ALL RESPONSES TO PHQ QUESTIONS 1-9: 0

## 2025-06-02 NOTE — PROGRESS NOTES
2025    Trent Jeffries (:  1960) is a 64 y.o. male, here for evaluation of the following chief complaint(s):  Hypertension      ASSESSMENT/PLAN:     Diagnosis Orders   1. Primary hypertension  Basic Metabolic Panel    at goal cont meds check renal          Return in about 6 months (around 2025) for HTN, screen lipid, screen psa.    An electronic signature was used to authenticate this note.    SUBJECTIVE/OBJECTIVE:  (NOTE : prior results listed below reviewed at this visit to assist in medical decision making.)    HPI / ROS    # HTN - ysabel meds no CP/SOB  BP Readings from Last 3 Encounters:   25 124/80   25 (!) 152/107   01/15/25 132/86     Lab Results   Component Value Date/Time     2024 10:15 AM    K 4.5 2024 10:15 AM     2024 10:15 AM    CO2 27 2024 10:15 AM    BUN 19 2024 10:15 AM    CREATININE 0.9 2024 10:15 AM    GLUCOSE 127 2024 10:15 AM    CALCIUM 9.6 2024 10:15 AM         # PSA screening history:  Lab Results   Component Value Date    PSA 0.50 2024    PSA 0.47 10/02/2023    PSA 0.57 10/03/2022     # Lipids - recent screening history:  Lab Results   Component Value Date    CHOL 161 2024    TRIG 71 2024    HDL 35 (L) 2024     (H) 2024    VLDL 14 2024     The 10-year ASCVD risk score (Naomi CLARK, et al., 2019) is: 13.7%    Values used to calculate the score:      Age: 64 years      Sex: Male      Is Non- : No      Diabetic: No      Tobacco smoker: No      Systolic Blood Pressure: 124 mmHg      Is BP treated: Yes      HDL Cholesterol: 35 mg/dL      Total Cholesterol: 161 mg/dL            Wt Readings from Last 3 Encounters:   25 109.3 kg (241 lb)   25 103.9 kg (229 lb)   25 103.9 kg (229 lb)       BP Readings from Last 3 Encounters:   25 124/80   25 (!) 152/107   01/15/25 132/86       PHYSICAL EXAM  Vitals:    25 0854

## 2025-06-04 ENCOUNTER — TELEPHONE (OUTPATIENT)
Dept: FAMILY MEDICINE CLINIC | Age: 65
End: 2025-06-04

## 2025-06-04 NOTE — TELEPHONE ENCOUNTER
Angelita from [facility] lab called in stating that for the basic metabolic panel it says collected in system but they have not received anything. Wanting to know if it was still at our practice

## 2025-08-13 PROBLEM — G56.21 CUBITAL TUNNEL SYNDROME ON RIGHT: Status: ACTIVE | Noted: 2025-08-13

## 2025-08-13 PROBLEM — G56.01 RIGHT CARPAL TUNNEL SYNDROME: Status: ACTIVE | Noted: 2025-08-13

## 2025-08-29 ENCOUNTER — OFFICE VISIT (OUTPATIENT)
Dept: FAMILY MEDICINE CLINIC | Age: 65
End: 2025-08-29
Payer: MEDICARE

## 2025-08-29 VITALS
HEART RATE: 69 BPM | BODY MASS INDEX: 39.37 KG/M2 | HEIGHT: 66 IN | WEIGHT: 245 LBS | RESPIRATION RATE: 18 BRPM | OXYGEN SATURATION: 95 % | SYSTOLIC BLOOD PRESSURE: 132 MMHG | DIASTOLIC BLOOD PRESSURE: 84 MMHG

## 2025-08-29 DIAGNOSIS — Z01.818 PREOP EXAMINATION: ICD-10-CM

## 2025-08-29 DIAGNOSIS — G56.21 CUBITAL TUNNEL SYNDROME ON RIGHT: Primary | ICD-10-CM

## 2025-08-29 DIAGNOSIS — I10 PRIMARY HYPERTENSION: ICD-10-CM

## 2025-08-29 LAB
ANION GAP SERPL CALCULATED.3IONS-SCNC: 10 MMOL/L (ref 3–16)
BASOPHILS # BLD: 0 K/UL (ref 0–0.2)
BASOPHILS NFR BLD: 0.6 %
BUN SERPL-MCNC: 17 MG/DL (ref 7–20)
CALCIUM SERPL-MCNC: 9.4 MG/DL (ref 8.3–10.6)
CHLORIDE SERPL-SCNC: 105 MMOL/L (ref 99–110)
CO2 SERPL-SCNC: 27 MMOL/L (ref 21–32)
CREAT SERPL-MCNC: 0.8 MG/DL (ref 0.8–1.3)
DEPRECATED RDW RBC AUTO: 13.5 % (ref 12.4–15.4)
EOSINOPHIL # BLD: 0.6 K/UL (ref 0–0.6)
EOSINOPHIL NFR BLD: 8.4 %
GFR SERPLBLD CREATININE-BSD FMLA CKD-EPI: >90 ML/MIN/{1.73_M2}
GLUCOSE SERPL-MCNC: 110 MG/DL (ref 70–99)
HCT VFR BLD AUTO: 44.6 % (ref 40.5–52.5)
HGB BLD-MCNC: 15.8 G/DL (ref 13.5–17.5)
LYMPHOCYTES # BLD: 1.4 K/UL (ref 1–5.1)
LYMPHOCYTES NFR BLD: 21 %
MCH RBC QN AUTO: 33.4 PG (ref 26–34)
MCHC RBC AUTO-ENTMCNC: 35.5 G/DL (ref 31–36)
MCV RBC AUTO: 94 FL (ref 80–100)
MONOCYTES # BLD: 0.8 K/UL (ref 0–1.3)
MONOCYTES NFR BLD: 11.6 %
NEUTROPHILS # BLD: 3.9 K/UL (ref 1.7–7.7)
NEUTROPHILS NFR BLD: 58.4 %
PLATELET # BLD AUTO: 246 K/UL (ref 135–450)
PMV BLD AUTO: 7.9 FL (ref 5–10.5)
POTASSIUM SERPL-SCNC: 4.4 MMOL/L (ref 3.5–5.1)
RBC # BLD AUTO: 4.75 M/UL (ref 4.2–5.9)
SODIUM SERPL-SCNC: 142 MMOL/L (ref 136–145)
WBC # BLD AUTO: 6.7 K/UL (ref 4–11)

## 2025-08-29 PROCEDURE — 36415 COLL VENOUS BLD VENIPUNCTURE: CPT | Performed by: FAMILY MEDICINE

## 2025-08-29 PROCEDURE — 1123F ACP DISCUSS/DSCN MKR DOCD: CPT | Performed by: FAMILY MEDICINE

## 2025-08-29 PROCEDURE — G8417 CALC BMI ABV UP PARAM F/U: HCPCS | Performed by: FAMILY MEDICINE

## 2025-08-29 PROCEDURE — 3079F DIAST BP 80-89 MM HG: CPT | Performed by: FAMILY MEDICINE

## 2025-08-29 PROCEDURE — 3017F COLORECTAL CA SCREEN DOC REV: CPT | Performed by: FAMILY MEDICINE

## 2025-08-29 PROCEDURE — G8427 DOCREV CUR MEDS BY ELIG CLIN: HCPCS | Performed by: FAMILY MEDICINE

## 2025-08-29 PROCEDURE — 99214 OFFICE O/P EST MOD 30 MIN: CPT | Performed by: FAMILY MEDICINE

## 2025-08-29 PROCEDURE — 93000 ELECTROCARDIOGRAM COMPLETE: CPT | Performed by: FAMILY MEDICINE

## 2025-08-29 PROCEDURE — 3075F SYST BP GE 130 - 139MM HG: CPT | Performed by: FAMILY MEDICINE

## 2025-08-29 PROCEDURE — G2211 COMPLEX E/M VISIT ADD ON: HCPCS | Performed by: FAMILY MEDICINE

## 2025-08-29 PROCEDURE — 1036F TOBACCO NON-USER: CPT | Performed by: FAMILY MEDICINE

## 2025-08-29 SDOH — ECONOMIC STABILITY: FOOD INSECURITY: WITHIN THE PAST 12 MONTHS, YOU WORRIED THAT YOUR FOOD WOULD RUN OUT BEFORE YOU GOT MONEY TO BUY MORE.: NEVER TRUE

## 2025-08-29 SDOH — ECONOMIC STABILITY: FOOD INSECURITY: WITHIN THE PAST 12 MONTHS, THE FOOD YOU BOUGHT JUST DIDN'T LAST AND YOU DIDN'T HAVE MONEY TO GET MORE.: NEVER TRUE

## 2025-08-29 ASSESSMENT — PATIENT HEALTH QUESTIONNAIRE - PHQ9
SUM OF ALL RESPONSES TO PHQ QUESTIONS 1-9: 0
1. LITTLE INTEREST OR PLEASURE IN DOING THINGS: NOT AT ALL
2. FEELING DOWN, DEPRESSED OR HOPELESS: NOT AT ALL
SUM OF ALL RESPONSES TO PHQ QUESTIONS 1-9: 0

## (undated) DEVICE — SUTURE NONABSORBABLE MONOFILAMENT 4-0 FS-2 18 IN ETHILON 662H

## (undated) DEVICE — SHOULDER CANNULA SET WITHOUT FENESTRATIONS, 5.5 MM (I.D.) X 70 MM: Brand: CONMED

## (undated) DEVICE — GLOVE ORANGE PI 8 1/2   MSG9085

## (undated) DEVICE — MINOR SET UP: Brand: MEDLINE INDUSTRIES, INC.

## (undated) DEVICE — GLOVE ORANGE PI 7   MSG9070

## (undated) DEVICE — IMMOBILIZER SHLDR SWTH UNIV DEV BLK P.A.D. III

## (undated) DEVICE — SUTURE PDS + SZ 2 0 L27IN ABSRB VLT L26MM CT 2 1 2 CIR PDP333H

## (undated) DEVICE — GLOVE SURG SZ 85 L12IN FNGR ORTHO 126MIL CRM LTX FREE

## (undated) DEVICE — SUTURE VCRL + SZ 3-0 L27IN ABSRB UD L26MM SH 1/2 CIR VCP416H

## (undated) DEVICE — PROBE ABLAT XL 90DEG ASPIR BPLR RF 1 PC ELECTRD ERGO HNDL

## (undated) DEVICE — SUTURE MCRYL + SZ 4-0 L18IN ABSRB UD L19MM PS-2 3/8 CIR MCP496G

## (undated) DEVICE — DRESSING,GAUZE,XEROFORM,CURAD,1"X8",ST: Brand: CURAD

## (undated) DEVICE — MERCY HEALTH WEST TURNOVER: Brand: MEDLINE INDUSTRIES, INC.

## (undated) DEVICE — GOWN SIRUS NONREIN XL W/TWL: Brand: MEDLINE INDUSTRIES, INC.

## (undated) DEVICE — STRL PENROSE DRAIN 18" X 1/2": Brand: CARDINAL HEALTH

## (undated) DEVICE — 3M™ COBAN™ NL STERILE NON-LATEX SELF-ADHERENT WRAP, 2086S, 6 IN X 5 YD (15 CM X 4,5 M), 12 ROLLS/CASE: Brand: 3M™ COBAN™

## (undated) DEVICE — 3M™ STERI-DRAPE™ INSTRUMENT POUCH 1018: Brand: STERI-DRAPE™

## (undated) DEVICE — DRAPE,REIN 53X77,STERILE: Brand: MEDLINE

## (undated) DEVICE — INTENDED TO SUPPORT AND MAINTAIN THE POSITION OF AN ANESTHETIZED PATIENT DURING SURGERY: Brand: ERIN BEACH CHAIR FACE MASK

## (undated) DEVICE — TUBING PMP L16FT MAIN DISP FOR AR-6400 AR-6475

## (undated) DEVICE — 3M™ STERI-STRIP™ COMPOUND BENZOIN TINCTURE 40 BAGS/CARTON 4 CARTONS/CASE C1544: Brand: 3M™ STERI-STRIP™

## (undated) DEVICE — COVER LT HNDL BLU PLAS

## (undated) DEVICE — GLOVE ORANGE PI 7 1/2   MSG9075

## (undated) DEVICE — SUTURE VCRL + SZ 0 L27IN ABSRB UD CT-1 L36MM 1/2 CIR TAPR VCP260H

## (undated) DEVICE — SUTURE VCRL + SZ 2-0 L27IN ABSRB CLR CT-1 1/2 CIR TAPERCUT VCP259H

## (undated) DEVICE — SOLUTION IRRIG 3000ML 0.9% SOD CHL USP UROMATIC PLAS CONT

## (undated) DEVICE — GOWN SIRUS NONREIN LG W/TWL: Brand: MEDLINE INDUSTRIES, INC.

## (undated) DEVICE — DRAPE,U/SHT,SPLIT,FILM,60X84,STERILE: Brand: MEDLINE

## (undated) DEVICE — SUTURE VCRL + SZ 4-0 L18IN ABSRB UD L19MM PS-2 3/8 CIR PRIM VCP496H

## (undated) DEVICE — DRAPE,MINOR PROC,6X6 FEN, STER: Brand: MEDLINE

## (undated) DEVICE — THIN OFFSET (9.0 X 0.38 X 25.0MM)

## (undated) DEVICE — SOLUTION IV IRRIG POUR BRL 0.9% SODIUM CHL 2F7124

## (undated) DEVICE — SHOULDER ARTHROSCOPY: Brand: MEDLINE INDUSTRIES, INC.

## (undated) DEVICE — LIQUIBAND RAPID ADHESIVE 36/CS 0.8ML: Brand: MEDLINE

## (undated) DEVICE — SUTURE PERMA-HAND SZ 2-0 L30IN NONABSORBABLE BLK L26MM SH K833H

## (undated) DEVICE — CLEANER,CAUTERY TIP,2X2",STERILE: Brand: MEDLINE

## (undated) DEVICE — TUBING, SUCTION, 1/4" X 12', STRAIGHT: Brand: MEDLINE

## (undated) DEVICE — DRAPE,SHOULDER,BEACH CHAIR,STERILE: Brand: MEDLINE

## (undated) DEVICE — SUTURE N ABSRB BRAIDED 2-0 OS-4 30 IN GRN ETHBND EXCEL X519H

## (undated) DEVICE — SUTURE VCRL + 1 L27IN ABSRB UD CT-1 L36MM 1/2 CIR TAPR PNT VCP261H

## (undated) DEVICE — SYRINGE MED 50ML LUERLOCK TIP

## (undated) DEVICE — BLADE SHV L13CM DIA4MM EXCALIBUR AGG COOLCUT

## (undated) DEVICE — SPONGE LAP W18XL18IN WHT COT 4 PLY FLD STRUNG RADPQ DISP ST